# Patient Record
Sex: FEMALE | Race: WHITE | NOT HISPANIC OR LATINO | Employment: FULL TIME | URBAN - METROPOLITAN AREA
[De-identification: names, ages, dates, MRNs, and addresses within clinical notes are randomized per-mention and may not be internally consistent; named-entity substitution may affect disease eponyms.]

---

## 2017-01-07 ENCOUNTER — LAB CONVERSION - ENCOUNTER (OUTPATIENT)
Dept: OTHER | Facility: OTHER | Age: 27
End: 2017-01-07

## 2017-01-07 LAB — TSH SERPL DL<=0.05 MIU/L-ACNC: 5.28 MIU/L

## 2017-01-10 ENCOUNTER — GENERIC CONVERSION - ENCOUNTER (OUTPATIENT)
Dept: OTHER | Facility: OTHER | Age: 27
End: 2017-01-10

## 2017-01-19 ENCOUNTER — ALLSCRIPTS OFFICE VISIT (OUTPATIENT)
Dept: OTHER | Facility: OTHER | Age: 27
End: 2017-01-19

## 2017-02-16 DIAGNOSIS — E03.8 OTHER SPECIFIED HYPOTHYROIDISM: ICD-10-CM

## 2017-02-16 DIAGNOSIS — E66.3 OVERWEIGHT: ICD-10-CM

## 2017-02-16 DIAGNOSIS — E04.1 NONTOXIC SINGLE THYROID NODULE: ICD-10-CM

## 2017-02-17 ENCOUNTER — LAB CONVERSION - ENCOUNTER (OUTPATIENT)
Dept: OTHER | Facility: OTHER | Age: 27
End: 2017-02-17

## 2017-02-17 LAB
T4 FREE SERPL-MCNC: 1.4 NG/DL (ref 0.8–1.8)
TSH SERPL DL<=0.05 MIU/L-ACNC: 3.03 MIU/L

## 2017-02-20 ENCOUNTER — GENERIC CONVERSION - ENCOUNTER (OUTPATIENT)
Dept: OTHER | Facility: OTHER | Age: 27
End: 2017-02-20

## 2017-03-20 DIAGNOSIS — E03.9 HYPOTHYROIDISM: ICD-10-CM

## 2017-03-20 DIAGNOSIS — E03.8 OTHER SPECIFIED HYPOTHYROIDISM: ICD-10-CM

## 2017-03-20 DIAGNOSIS — E04.1 NONTOXIC SINGLE THYROID NODULE: ICD-10-CM

## 2017-03-25 ENCOUNTER — LAB CONVERSION - ENCOUNTER (OUTPATIENT)
Dept: OTHER | Facility: OTHER | Age: 27
End: 2017-03-25

## 2017-03-25 LAB
T4 FREE SERPL-MCNC: 1.3 NG/DL (ref 0.8–1.8)
TSH SERPL DL<=0.05 MIU/L-ACNC: 0.97 MIU/L

## 2017-03-31 ENCOUNTER — GENERIC CONVERSION - ENCOUNTER (OUTPATIENT)
Dept: OTHER | Facility: OTHER | Age: 27
End: 2017-03-31

## 2017-04-28 ENCOUNTER — GENERIC CONVERSION - ENCOUNTER (OUTPATIENT)
Dept: OTHER | Facility: OTHER | Age: 27
End: 2017-04-28

## 2017-05-15 DIAGNOSIS — E04.1 NONTOXIC SINGLE THYROID NODULE: ICD-10-CM

## 2017-05-15 DIAGNOSIS — O99.281 ENDOCRINE, NUTRITIONAL AND METABOLIC DISEASES COMPLICATING PREGNANCY, FIRST TRIMESTER: ICD-10-CM

## 2017-05-15 DIAGNOSIS — O99.211 OBESITY COMPLICATING PREGNANCY IN FIRST TRIMESTER: ICD-10-CM

## 2017-05-15 DIAGNOSIS — E03.8 OTHER SPECIFIED HYPOTHYROIDISM: ICD-10-CM

## 2017-05-15 DIAGNOSIS — E03.9 HYPOTHYROIDISM: ICD-10-CM

## 2017-05-15 DIAGNOSIS — O09.01 SUPERVISION OF PREGNANCY WITH HISTORY OF INFERTILITY IN FIRST TRIMESTER: ICD-10-CM

## 2017-05-27 ENCOUNTER — LAB CONVERSION - ENCOUNTER (OUTPATIENT)
Dept: OTHER | Facility: OTHER | Age: 27
End: 2017-05-27

## 2017-05-27 LAB
T4 FREE SERPL-MCNC: 1.5 NG/DL (ref 0.8–1.8)
TSH SERPL DL<=0.05 MIU/L-ACNC: 0.71 MIU/L

## 2017-06-01 ENCOUNTER — GENERIC CONVERSION - ENCOUNTER (OUTPATIENT)
Dept: OTHER | Facility: OTHER | Age: 27
End: 2017-06-01

## 2017-06-06 ENCOUNTER — ALLSCRIPTS OFFICE VISIT (OUTPATIENT)
Dept: OTHER | Facility: OTHER | Age: 27
End: 2017-06-06

## 2017-06-06 LAB
EXTERNAL CHLAMYDIA SCREEN: NORMAL
EXTERNAL GONORRHEA SCREEN: NORMAL

## 2017-06-08 ENCOUNTER — LAB CONVERSION - ENCOUNTER (OUTPATIENT)
Dept: OTHER | Facility: OTHER | Age: 27
End: 2017-06-08

## 2017-06-08 LAB
BILIRUB UR QL STRIP: NEGATIVE
COLOR UR: YELLOW
COMMENT (HISTORICAL): CLEAR
CULTURE RESULT (HISTORICAL): NORMAL
FECAL OCCULT BLOOD DIAGNOSTIC (HISTORICAL): NEGATIVE
GLUCOSE (HISTORICAL): NEGATIVE
KETONES UR STRIP-MCNC: ABNORMAL MG/DL
LEUKOCYTE ESTERASE UR QL STRIP: NEGATIVE
NITRITE UR QL STRIP: NEGATIVE
PH UR STRIP.AUTO: 5.5 [PH] (ref 5–8)
PROT UR STRIP-MCNC: NEGATIVE MG/DL
SP GR UR STRIP.AUTO: 1.03 (ref 1–1.03)

## 2017-06-13 LAB — CLINICAL COMMENT (HISTORICAL): NORMAL

## 2017-06-24 LAB
EXTERNAL ABO GROUPING: NORMAL
EXTERNAL ANTIBODY SCREEN: NORMAL
EXTERNAL HEMATOCRIT: 36.5 %
EXTERNAL HEMOGLOBIN: 11.8 G/DL
EXTERNAL HEPATITIS B SURFACE ANTIGEN: NORMAL
EXTERNAL HIV-1 ANTIBODY: NORMAL
EXTERNAL PLATELET COUNT: 226 K/ΜL
EXTERNAL RH FACTOR: POSITIVE
EXTERNAL RUBELLA IGG QUANTITATION: NORMAL
EXTERNAL SYPHILIS RPR SCREEN: NORMAL

## 2017-06-26 ENCOUNTER — LAB CONVERSION - ENCOUNTER (OUTPATIENT)
Dept: OTHER | Facility: OTHER | Age: 27
End: 2017-06-26

## 2017-06-26 LAB
AB SCRN, RBC W/RFLX ID,TITER,AG (HISTORICAL): NORMAL
ABO GROUP BLD: NORMAL
BASOPHILS # BLD AUTO: 0.7 %
BASOPHILS # BLD AUTO: 39 CELLS/UL (ref 0–200)
DEPRECATED RDW RBC AUTO: 14.5 % (ref 11–15)
EOSINOPHIL # BLD AUTO: 112 CELLS/UL (ref 15–500)
EOSINOPHIL # BLD AUTO: 2 %
GLUCOSE 1 HR 50 GM GLUC CHALLENGE-PREG PTS (HISTORICAL): 77 MG/DL
HCT VFR BLD AUTO: 36.5 % (ref 35–45)
HEPATITIS B SURFACE ANTIGEN (HISTORICAL): NORMAL
HGB BLD-MCNC: 11.8 G/DL (ref 11.7–15.5)
HIV AG/AB, 4TH GEN (HISTORICAL): NORMAL
LYMPHOCYTES # BLD AUTO: 1114 CELLS/UL (ref 850–3900)
LYMPHOCYTES # BLD AUTO: 19.9 %
MCH RBC QN AUTO: 28.1 PG (ref 27–33)
MCHC RBC AUTO-ENTMCNC: 32.3 G/DL (ref 32–36)
MCV RBC AUTO: 86.9 FL (ref 80–100)
MONOCYTES # BLD AUTO: 319 CELLS/UL (ref 200–950)
MONOCYTES (HISTORICAL): 5.7 %
NEUTROPHILS # BLD AUTO: 4015 CELLS/UL (ref 1500–7800)
NEUTROPHILS # BLD AUTO: 71.7 %
PLATELET # BLD AUTO: 226 THOUSAND/UL (ref 140–400)
PMV BLD AUTO: 8.7 FL (ref 7.5–12.5)
RBC # BLD AUTO: 4.2 MILLION/UL (ref 3.8–5.1)
RH BLD: NORMAL
RPR SCREEN (HISTORICAL): NORMAL
RUBELLA, IGG (HISTORICAL): 2.23 INDEX
WBC # BLD AUTO: 5.6 THOUSAND/UL (ref 3.8–10.8)

## 2017-06-28 ENCOUNTER — GENERIC CONVERSION - ENCOUNTER (OUTPATIENT)
Dept: OTHER | Facility: OTHER | Age: 27
End: 2017-06-28

## 2017-06-29 ENCOUNTER — ALLSCRIPTS OFFICE VISIT (OUTPATIENT)
Dept: PERINATAL CARE | Facility: CLINIC | Age: 27
End: 2017-06-29
Payer: COMMERCIAL

## 2017-06-29 ENCOUNTER — GENERIC CONVERSION - ENCOUNTER (OUTPATIENT)
Dept: OTHER | Facility: OTHER | Age: 27
End: 2017-06-29

## 2017-06-29 DIAGNOSIS — O99.281 ENDOCRINE, NUTRITIONAL AND METABOLIC DISEASES COMPLICATING PREGNANCY, FIRST TRIMESTER: ICD-10-CM

## 2017-06-29 DIAGNOSIS — E03.8 OTHER SPECIFIED HYPOTHYROIDISM: ICD-10-CM

## 2017-06-29 DIAGNOSIS — E04.1 NONTOXIC SINGLE THYROID NODULE: ICD-10-CM

## 2017-06-29 DIAGNOSIS — E03.9 HYPOTHYROIDISM: ICD-10-CM

## 2017-06-29 PROCEDURE — 76813 OB US NUCHAL MEAS 1 GEST: CPT | Performed by: OBSTETRICS & GYNECOLOGY

## 2017-06-29 PROCEDURE — 76801 OB US < 14 WKS SINGLE FETUS: CPT | Performed by: OBSTETRICS & GYNECOLOGY

## 2017-07-01 ENCOUNTER — LAB CONVERSION - ENCOUNTER (OUTPATIENT)
Dept: OTHER | Facility: OTHER | Age: 27
End: 2017-07-01

## 2017-07-01 LAB
T4 FREE SERPL-MCNC: 1.3 NG/DL (ref 0.8–1.8)
TSH SERPL DL<=0.05 MIU/L-ACNC: 0.35 MIU/L

## 2017-07-03 ENCOUNTER — GENERIC CONVERSION - ENCOUNTER (OUTPATIENT)
Dept: OTHER | Facility: OTHER | Age: 27
End: 2017-07-03

## 2017-07-04 ENCOUNTER — GENERIC CONVERSION - ENCOUNTER (OUTPATIENT)
Dept: OTHER | Facility: OTHER | Age: 27
End: 2017-07-04

## 2017-07-06 ENCOUNTER — LAB CONVERSION - ENCOUNTER (OUTPATIENT)
Dept: OTHER | Facility: OTHER | Age: 27
End: 2017-07-06

## 2017-07-06 LAB
AGE RISK DOWN SYNDROME (HISTORICAL): NORMAL
CALC'D GESTATIONAL AGE (HISTORICAL): 13
COLLECTION DATE (HISTORICAL): NORMAL
CROWN RUMP LENGTH (HISTORICAL): 68 MM
CROWN RUMP LENGTH (HISTORICAL): NORMAL MM
DATE OF BIRTH (HISTORICAL): NORMAL
DONOR AGE; EGG RETRIEVAL (HISTORICAL): NORMAL
DONOR EGG (HISTORICAL): NO
EDD DETERMINED BY (HISTORICAL): NORMAL
ESTIMATED DELIVERY DATE (EDD) (HISTORICAL): NORMAL
HCG MOM (HISTORICAL): 0.72
HCG QUANTITATIVE (HISTORICAL): 47.1 IU/ML
HX OF NEURAL TUBE DEFECTS (HISTORICAL): NO
IF TWINS (HISTORICAL): NORMAL
INSULIN DEP. DIABETIC (HISTORICAL): NO
INTERPRETATION (HISTORICAL): NORMAL
MATERNAL WEIGHT (HISTORICAL): 196 LBS
MSS DOWN SYNDROME RISK (HISTORICAL): NORMAL
MSS3 TRISOMY 18 RISK (HISTORICAL): NORMAL
NASAL BONE (HISTORICAL): NORMAL
NASAL BONE (HISTORICAL): PRESENT
NT MOM (HISTORICAL): 1.19
NTQR LOCATION ID (HISTORICAL): NORMAL
NTQR READING PHYS ID (HISTORICAL): NORMAL
NUCHAL TRANSLUCENCY (HISTORICAL): 1.8 MM
NUCHAL TRANSLUCENCY (HISTORICAL): NORMAL MM
NUMBER OF FETUSES (HISTORICAL): 1
PAPP-A (HISTORICAL): 1.12
PAPP-A (HISTORICAL): 822.5 NG/ML
PREV PREGNANCY DOWN SYND (HISTORICAL): NO
RACE/ETHNIC ORIGIN (HISTORICAL): NORMAL
REFERRING PHYSICIAN (HISTORICAL): NORMAL
REFERRING PHYSICIAN NPI (HISTORICAL): NORMAL
REFERRING PHYSICIAN PHONE (HISTORICAL): NORMAL
REPEAT SPECIMEN (HISTORICAL): NO
ULTRASONOGRAPHER ID (HISTORICAL): NORMAL
ULTRASOUND DATE (HISTORICAL): NORMAL

## 2017-07-07 ENCOUNTER — GENERIC CONVERSION - ENCOUNTER (OUTPATIENT)
Dept: OTHER | Facility: OTHER | Age: 27
End: 2017-07-07

## 2017-07-21 ENCOUNTER — LAB CONVERSION - ENCOUNTER (OUTPATIENT)
Dept: OTHER | Facility: OTHER | Age: 27
End: 2017-07-21

## 2017-07-21 LAB
AFP (HISTORICAL): 1.08
AFP (HISTORICAL): 27.5 NG/ML
AGE RISK DOWN SYNDROME (HISTORICAL): NORMAL
CALC'D GESTATIONAL AGE (HISTORICAL): 15.6
COLLECTION DATE (HISTORICAL): NORMAL
CROWN RUMP LENGTH (HISTORICAL): 68 MM
DATE OF BIRTH (HISTORICAL): NORMAL
ESTIMATED DELIVERY DATE (EDD) (HISTORICAL): NORMAL
ESTRADIOL, FREE (HISTORICAL): 0.6 NG/ML
ESTRIOL MOM (HISTORICAL): 0.92
HCG MOM (HISTORICAL): 0.59
HCG QUANTITATIVE (HISTORICAL): 19.4 IU/ML
HX OF NEURAL TUBE DEFECTS (HISTORICAL): NO
INHIBIN A (HISTORICAL): 105 PG/ML
INHIBIN A MOM (HISTORICAL): 0.68
INSULIN DEP. DIABETIC (HISTORICAL): NO
INTERPRETATION (HISTORICAL): NORMAL
MATERNAL WEIGHT (HISTORICAL): 199 LBS
MSAFP RISK OPEN NTD (HISTORICAL): NORMAL
MSS DOWN SYNDROME RISK (HISTORICAL): NORMAL
MSS3 TRISOMY 18 RISK (HISTORICAL): NORMAL
NASAL BONE (HISTORICAL): NORMAL
NASAL BONE (HISTORICAL): PRESENT
NT MOM (HISTORICAL): 1.19
NUCHAL TRANSLUCENCY (HISTORICAL): 1.8 MM
NUMBER OF FETUSES (HISTORICAL): 1
PAPP-A (HISTORICAL): 1.14
PAPP-A (HISTORICAL): 822.5 NG/ML
RACE/ETHNIC ORIGIN (HISTORICAL): NORMAL
REFERRING PHYSICIAN (HISTORICAL): NORMAL
REFERRING PHYSICIAN NPI (HISTORICAL): NORMAL
REFERRING PHYSICIAN PHONE (HISTORICAL): NORMAL
REPEAT SPECIMEN (HISTORICAL): NO
SPECIMEN: (HISTORICAL): NORMAL
ULTRASOUND DATE (HISTORICAL): NORMAL

## 2017-07-24 ENCOUNTER — GENERIC CONVERSION - ENCOUNTER (OUTPATIENT)
Dept: OTHER | Facility: OTHER | Age: 27
End: 2017-07-24

## 2017-07-28 ENCOUNTER — GENERIC CONVERSION - ENCOUNTER (OUTPATIENT)
Dept: OTHER | Facility: OTHER | Age: 27
End: 2017-07-28

## 2017-08-03 DIAGNOSIS — E03.8 OTHER SPECIFIED HYPOTHYROIDISM: ICD-10-CM

## 2017-08-05 ENCOUNTER — LAB CONVERSION - ENCOUNTER (OUTPATIENT)
Dept: OTHER | Facility: OTHER | Age: 27
End: 2017-08-05

## 2017-08-05 LAB
T4 FREE SERPL-MCNC: 1.4 NG/DL (ref 0.8–1.8)
TSH SERPL DL<=0.05 MIU/L-ACNC: 0.6 MIU/L

## 2017-08-06 ENCOUNTER — GENERIC CONVERSION - ENCOUNTER (OUTPATIENT)
Dept: OTHER | Facility: OTHER | Age: 27
End: 2017-08-06

## 2017-08-20 ENCOUNTER — GENERIC CONVERSION - ENCOUNTER (OUTPATIENT)
Dept: OTHER | Facility: OTHER | Age: 27
End: 2017-08-20

## 2017-08-21 ENCOUNTER — ALLSCRIPTS OFFICE VISIT (OUTPATIENT)
Dept: PERINATAL CARE | Facility: CLINIC | Age: 27
End: 2017-08-21
Payer: COMMERCIAL

## 2017-08-21 ENCOUNTER — GENERIC CONVERSION - ENCOUNTER (OUTPATIENT)
Dept: OTHER | Facility: OTHER | Age: 27
End: 2017-08-21

## 2017-08-21 ENCOUNTER — ALLSCRIPTS OFFICE VISIT (OUTPATIENT)
Dept: OTHER | Facility: OTHER | Age: 27
End: 2017-08-21

## 2017-08-21 PROCEDURE — 76817 TRANSVAGINAL US OBSTETRIC: CPT | Performed by: OBSTETRICS & GYNECOLOGY

## 2017-08-21 PROCEDURE — 76811 OB US DETAILED SNGL FETUS: CPT | Performed by: OBSTETRICS & GYNECOLOGY

## 2017-09-04 DIAGNOSIS — O99.282 ENDOCRINE, NUTRITIONAL AND METABOLIC DISEASES COMPLICATING PREGNANCY, SECOND TRIMESTER: ICD-10-CM

## 2017-09-04 DIAGNOSIS — E03.9 HYPOTHYROIDISM: ICD-10-CM

## 2017-09-04 DIAGNOSIS — E04.1 NONTOXIC SINGLE THYROID NODULE: ICD-10-CM

## 2017-09-04 DIAGNOSIS — E03.8 OTHER SPECIFIED HYPOTHYROIDISM: ICD-10-CM

## 2017-09-06 ENCOUNTER — LAB CONVERSION - ENCOUNTER (OUTPATIENT)
Dept: OTHER | Facility: OTHER | Age: 27
End: 2017-09-06

## 2017-09-06 LAB
T4 FREE SERPL-MCNC: 1.2 NG/DL (ref 0.8–1.8)
TSH SERPL DL<=0.05 MIU/L-ACNC: 0.42 MIU/L

## 2017-09-11 ENCOUNTER — GENERIC CONVERSION - ENCOUNTER (OUTPATIENT)
Dept: OTHER | Facility: OTHER | Age: 27
End: 2017-09-11

## 2017-09-13 ENCOUNTER — LAB CONVERSION - ENCOUNTER (OUTPATIENT)
Dept: OTHER | Facility: OTHER | Age: 27
End: 2017-09-13

## 2017-09-27 ENCOUNTER — ALLSCRIPTS OFFICE VISIT (OUTPATIENT)
Dept: OTHER | Facility: OTHER | Age: 27
End: 2017-09-27

## 2017-10-15 ENCOUNTER — LAB CONVERSION - ENCOUNTER (OUTPATIENT)
Dept: OTHER | Facility: OTHER | Age: 27
End: 2017-10-15

## 2017-10-15 LAB
T4 FREE SERPL-MCNC: 1.3 NG/DL (ref 0.8–1.8)
TSH SERPL DL<=0.05 MIU/L-ACNC: 0.56 MIU/L

## 2017-10-16 ENCOUNTER — GENERIC CONVERSION - ENCOUNTER (OUTPATIENT)
Dept: OTHER | Facility: OTHER | Age: 27
End: 2017-10-16

## 2017-10-16 ENCOUNTER — ALLSCRIPTS OFFICE VISIT (OUTPATIENT)
Dept: OTHER | Facility: OTHER | Age: 27
End: 2017-10-16

## 2017-10-16 DIAGNOSIS — O99.213 OBESITY COMPLICATING PREGNANCY IN THIRD TRIMESTER: ICD-10-CM

## 2017-10-16 DIAGNOSIS — O09.813 SUPERVISION OF PREGNANCY RESULTING FROM ASSISTED REPRODUCTIVE TECHNOLOGY IN THIRD TRIMESTER: ICD-10-CM

## 2017-10-16 DIAGNOSIS — O99.283 ENDOCRINE, NUTRITIONAL AND METABOLIC DISEASES COMPLICATING PREGNANCY, THIRD TRIMESTER: ICD-10-CM

## 2017-10-17 ENCOUNTER — LAB CONVERSION - ENCOUNTER (OUTPATIENT)
Dept: OTHER | Facility: OTHER | Age: 27
End: 2017-10-17

## 2017-10-17 LAB
BASOPHILS # BLD AUTO: 0.4 %
BASOPHILS # BLD AUTO: 28 CELLS/UL (ref 0–200)
DEPRECATED RDW RBC AUTO: 13.3 % (ref 11–15)
EOSINOPHIL # BLD AUTO: 1.6 %
EOSINOPHIL # BLD AUTO: 114 CELLS/UL (ref 15–500)
GLUCOSE 1 HR 50 GM GLUC CHALLENGE-PREG PTS (HISTORICAL): 100 MG/DL
HCT VFR BLD AUTO: 32.1 % (ref 35–45)
HGB BLD-MCNC: 10.7 G/DL (ref 11.7–15.5)
LYMPHOCYTES # BLD AUTO: 1179 CELLS/UL (ref 850–3900)
LYMPHOCYTES # BLD AUTO: 16.6 %
MCH RBC QN AUTO: 29.5 PG (ref 27–33)
MCHC RBC AUTO-ENTMCNC: 33.3 G/DL (ref 32–36)
MCV RBC AUTO: 88.4 FL (ref 80–100)
MONOCYTES # BLD AUTO: 383 CELLS/UL (ref 200–950)
MONOCYTES (HISTORICAL): 5.4 %
NEUTROPHILS # BLD AUTO: 5396 CELLS/UL (ref 1500–7800)
NEUTROPHILS # BLD AUTO: 76 %
PLATELET # BLD AUTO: 216 THOUSAND/UL (ref 140–400)
PMV BLD AUTO: 10.5 FL (ref 7.5–12.5)
RBC # BLD AUTO: 3.63 MILLION/UL (ref 3.8–5.1)
RPR SCREEN (HISTORICAL): NORMAL
WBC # BLD AUTO: 7.1 THOUSAND/UL (ref 3.8–10.8)

## 2017-10-30 ENCOUNTER — ALLSCRIPTS OFFICE VISIT (OUTPATIENT)
Dept: OTHER | Facility: OTHER | Age: 27
End: 2017-10-30

## 2017-11-14 LAB
T4 FREE SERPL-MCNC: 1.4 NG/DL (ref 0.9–2.2)
T4 TOTAL (HISTORICAL): 13.3 MCG/DL (ref 4.8–10.4)
TSH SERPL DL<=0.05 MIU/L-ACNC: 0.6 MIU/L

## 2017-11-17 ENCOUNTER — ALLSCRIPTS OFFICE VISIT (OUTPATIENT)
Dept: OTHER | Facility: OTHER | Age: 27
End: 2017-11-17

## 2017-11-18 NOTE — PROCEDURES
Assessment  1  Excessive weight gain in pregnancy, third trimester (999 40,342 9) (O26 03)   2  Pregnancy resulting from in vitro fertilization in third trimester (V23 85) (A58 498)    Active Problems     1  Adult hypothyroidism (244 9) (E03 9)   2  Hypothyroidism due to Hashimoto's thyroiditis (244 8,245 2) (E03 8,E06 3)   3  Need for prophylactic vaccination and inoculation against influenza (V04 81) (Z23)   4  Overweight (BMI 25 0-29 9) (278 02) (E66 3)   5  Pregnancy with history of infertility in first trimester (V23 0) (O09 01)   6  Right thyroid nodule (241 0) (E04 1)   7  Threatened premature labor in second trimester (644 03) (O47 02)  Hypothyroid in pregnancy, antepartum, third trimester (987 96) (O99 283)     Obesity affecting pregnancy in third trimester (649 13) (O99 213)     Pregnancy resulting from in vitro fertilization in third trimester (V23 85) (O09 813)     Excessive weight gain in pregnancy, third trimester (646 13) (O26 03)       Current Meds    1  Levothyroxine Sodium 175 MCG Oral Tablet; TAKE 1 TABLET DAILY; Therapy: 29CJU4536 to (Evaluate:02Drz6457)  Requested for: 53RTZ4547; Last Rx:14Nov2017; Status: ACTIVE - Renewal Denied Ordered    2  Prenatal Vitamin TABS; TAKE 1 TABLET DAILY; Therapy: (Recorded:19Jan2017) to Recorded   3  Progesterone CAPS; 1 DAILY; Therapy: (Recorded:49Uhc0459) to Recorded    Allergies  1  No Known Drug Allergies    2  No Known Environmental Allergies   3  Nuts   4  VEGETABLES    Results/Data  [id number] Abdominal Ultrasound OB [place]:  Procedure: 42786- Ultrasound pregnant uterus real time with image documentation, limited one or more fetuses  -- The study was done today in the office  Indication: EDC gestational age 27w0d weeks  Exam indication: Growth  Findings:  Amniotic fluid volume: 20 1cm  Fetal heart beat: 138bpm   Placental location: Posterior Mid II  Fetal position: VERTEX    Impression: 53%=0mf02wn  is a single intrauterine pregnancy measuring 33 weeks 6 days which is consistent with the EGA of 33 weeks  The fetus is in cephalic presentation and the FHR is 138 bpm  The placenta is posterior in location and grade II in appearance  There has been adequate fetal growth  The EFW is 2199g (4#14oz, 53%)  The amniotic fluid index measures 20 1 cm  F/U as clinically indicated8  47cm (34+1, 76%)30 97 cm (34+4, 55%)28 9 cm (32+6, 48%)FL: 6 56 cm (33+6, 61%)  Future Appointments    Date/Time Provider Specialty Site   12/01/2017 09:30 AM Ed CAESAR Forrest  Obstetrics/Gynecology [address] [place] [address] OB/GYN   12/18/2017 03:45 PM Ed CAESAR Forrest  Obstetrics/Gynecology [address] [place] [address] OB/GYN   01/08/2018 10:00 AM Ed CAESAR Forrest  Obstetrics/Gynecology [address] [place] [address] OB/GYN   12/05/2017 12:50 PM CAESAR Rosas  Endocrinology [facility] ENDOCRINOLOGY   12/11/2017 03:45 PM CAESAR Fonseca  Obstetrics/Gynecology [address] [place] [address] OB/GYN   12/26/2017 10:00 AM CAESAR Fonseca  Obstetrics/Gynecology [address] [place] [address] OB/GYN   01/02/2018 10:00 AM CAESAR Fonseca OB/GYN       Signatures   Electronically signed by : Geoffrey Sy, ; Nov 17 2017  9:06AM EST                       (Author)    Electronically signed by : CAESAR Reed ; Nov 17 2017  2:07PM EST                       (Author)

## 2017-12-01 ENCOUNTER — GENERIC CONVERSION - ENCOUNTER (OUTPATIENT)
Dept: OTHER | Facility: OTHER | Age: 27
End: 2017-12-01

## 2017-12-07 ENCOUNTER — ALLSCRIPTS OFFICE VISIT (OUTPATIENT)
Dept: OTHER | Facility: OTHER | Age: 27
End: 2017-12-07

## 2017-12-07 DIAGNOSIS — O99.283 ENDOCRINE, NUTRITIONAL AND METABOLIC DISEASES COMPLICATING PREGNANCY, THIRD TRIMESTER: ICD-10-CM

## 2017-12-11 ENCOUNTER — GENERIC CONVERSION - ENCOUNTER (OUTPATIENT)
Dept: OTHER | Facility: OTHER | Age: 27
End: 2017-12-11

## 2017-12-11 LAB — CULTURE GENITAL-BSB ON (HISTORICAL): NORMAL

## 2017-12-12 ENCOUNTER — LAB CONVERSION - ENCOUNTER (OUTPATIENT)
Dept: OTHER | Facility: OTHER | Age: 27
End: 2017-12-12

## 2017-12-12 LAB
EXTERNAL GROUP B STREP ANTIGEN: NEGATIVE
T4 FREE SERPL-MCNC: 1.3 NG/DL (ref 0.8–1.8)
TSH SERPL DL<=0.05 MIU/L-ACNC: 0.43 MIU/L

## 2017-12-18 ENCOUNTER — GENERIC CONVERSION - ENCOUNTER (OUTPATIENT)
Dept: OTHER | Facility: OTHER | Age: 27
End: 2017-12-18

## 2017-12-21 ENCOUNTER — OB ABSTRACT (OUTPATIENT)
Dept: OBGYN CLINIC | Facility: CLINIC | Age: 27
End: 2017-12-21

## 2017-12-21 PROBLEM — O99.283 HYPOTHYROID IN PREGNANCY, ANTEPARTUM, THIRD TRIMESTER: Status: ACTIVE | Noted: 2017-12-21

## 2017-12-21 PROBLEM — O09.813 PREGNANCY RESULTING FROM IN VITRO FERTILIZATION IN THIRD TRIMESTER: Status: ACTIVE | Noted: 2017-12-21

## 2017-12-21 PROBLEM — E03.9 HYPOTHYROID IN PREGNANCY, ANTEPARTUM, THIRD TRIMESTER: Status: ACTIVE | Noted: 2017-12-21

## 2017-12-21 PROBLEM — O26.03 EXCESSIVE WEIGHT GAIN DURING PREGNANCY IN THIRD TRIMESTER: Status: ACTIVE | Noted: 2017-12-21

## 2017-12-21 RX ORDER — LEVOTHYROXINE SODIUM 175 UG/1
175 TABLET ORAL DAILY
COMMUNITY
End: 2018-03-05

## 2017-12-26 ENCOUNTER — GENERIC CONVERSION - ENCOUNTER (OUTPATIENT)
Dept: OTHER | Facility: OTHER | Age: 27
End: 2017-12-26

## 2018-01-02 ENCOUNTER — GENERIC CONVERSION - ENCOUNTER (OUTPATIENT)
Dept: OTHER | Facility: OTHER | Age: 28
End: 2018-01-02

## 2018-01-09 ENCOUNTER — GENERIC CONVERSION - ENCOUNTER (OUTPATIENT)
Dept: OTHER | Facility: OTHER | Age: 28
End: 2018-01-09

## 2018-01-09 NOTE — RESULT NOTES
Verified Results  (Q) STEPWISE, PART 1 [id number] 03:35PM Ayanna Griffith     Test Name Result Flag Reference   INTERPRETATION SEE NOTE     This patient's risk does not exceed the first trimester  cut-off for Down syndrome or trisomy 18  The integrated  screen calculation is awaiting the second trimester sample  NT WAS USED IN THE RISK CALCULATIONS  Thank you for submitting this patient's Part 1 specimen  These first trimester values will be incorporated with the  second trimester values as part of the integrated testing  process  Please submit the Part 2 specimen between   07/14/2017-09/07/2017 (15 0 and 22 9 weeks gestation) with   07/14/2017-07/27/2017 (15 0 - 16 9 weeks gestation) being  optimal  When submitting Part 2, please include the  following Specimen # from Part 1:  Cervantes Colonel   AGE RISK DOWN SYNDROME 1:640     AMY DOWN SYNDROME RISK <1:5000  <1:50   RISK FOR TRISOMY 18 <1:5000  <1:100   CALCULATED GESTATIONAL$AGE 13 0     Crown rump length (CRL) was used to calculate gestational  age  KARAN, if provided, was not used for gestational age  dating  YAJAIRA-A 822 5 ng/mL     This test was performed using a kit that has not been  cleared or approved by the FDA  The analytical performance  characteristics of this test have been determined by [facility]  This test  should not be used for diagnosis without confirmation by  other medically established means  YAJAIRA-A MOM 1 12     HCG 47 1 IU/mL     HCG MOM 0 72     NT MOM 1 19     The maternal serum screening results indicate a lower risk  of trisomy 21 in this pregnancy  The nasal bone was assessed  via ultrasound and was present  The combined risk is  therefore likely to be less than the calculated risk  Other  findings later in the pregnancy may change the risk  Nasal bone assessment is best accomplished through a fetal  ultrasound performed between 11 weeks 0 days through 13  weeks 6 days   In assessing the risk for aneuploidy, the  evaluation of the maternal serum markers plus the nuchal  thickness measurement is calculated first  Any potential  change to the patient's risk for aneuploidy depends on the  nuchal thickness, crown-rump length, and the ethnic origin,  and therefore the values generated by the algorithm itself  will not change  Additional information about the assessment  of the fetal nasal bone may be found on the [facility] website at  http://www  fetalmedicine com/fmf/training-certification/cert  avyglitr-lg-nlqhr  tence/11-13-week-scan/assessment-of-the-nasal-bone/  Please note that the Sequential Integrated maternal serum  screen for Down syndrome risk assessment was designed by Dr Magdalena Ricketts ([address], et al J Med Screen 2003 v10 p56-104)  to provide a high detection rate and low false positive rate  when a cutoff of 1:50 is used to identify high risk  pregnancies during the first trimester  Use of any other  cutoff for determination of risk in the first trimester will  result in a higher false positive rate for the two-part  screen  All patients whose risk is lower than 1:50 should  have a second sample submitted to complete the screen  This is a screening test, not a diagnostic test      This risk assessment is based on demographic data provided  by the ordering physician  Please notify the laboratory  promptly if any data are incorrect  If you have questions concerning this report: For clinical consultation, call [phone]; For technical questions, call [phone] ext [phone]; For recalculations, fax to [phone]  For additional information, please refer to  http://[employer]/faq/FAQ85  (This link is provided for informational/educational  purposes only )   REFERRING PHYSICIAN NAME [address] [place] [address] PHONE [phone]     REFERRING PHYSICIAN NPI [id number]     DATE OF BIRTH 1990     COLLECTION DATE 06/30/2017     ULTRASOUND DATE 06/29/2017     ULTRASONOGRAPHER'S NAME TELLO WINTER     NTQR ULTRASONOGRAPHER ID# [id number]     NTQR LOCATION ID# NOT GIVEN     NTQR READING PHYS ID# [id number]     [facility] ULTRASONOGRAPHER ID# NOT GIVEN     CROWN RUMP LENGTH 68 mm     NUCHAL TRANSLUCENCY 1 8 mm     IF TWINS NOT GIVEN     TWIN B CRL NOT GIVEN mm     TWIN B NT NOT GIVEN mm     MATERNAL WEIGHT 196 lbs     EST'D DATE OF DELIVERY 01/05/2018     KARAN DETERMINED BY LMP     MOTHER'S ETHNIC ORIGIN      NUMBER OF FETUSES 1     INSULIN DEPEND DIABETIC NO     REPEAT SPECIMEN NO     HX OF NEURAL TUBE DEFECTS NO     PREV PREG DOWN SYND NO     DONOR EGG NO     DONOR EGG; EGG RETRIEVAL NOT GIVEN     Nasal Bone PRESENT     Twin B Nasal Bone NOT GIVEN

## 2018-01-10 ENCOUNTER — ANESTHESIA (INPATIENT)
Dept: LABOR AND DELIVERY | Facility: HOSPITAL | Age: 28
End: 2018-01-10
Payer: COMMERCIAL

## 2018-01-10 ENCOUNTER — ANESTHESIA EVENT (INPATIENT)
Dept: LABOR AND DELIVERY | Facility: HOSPITAL | Age: 28
End: 2018-01-10
Payer: COMMERCIAL

## 2018-01-10 ENCOUNTER — HOSPITAL ENCOUNTER (INPATIENT)
Facility: HOSPITAL | Age: 28
LOS: 2 days | Discharge: HOME | End: 2018-01-12
Attending: OBSTETRICS & GYNECOLOGY | Admitting: OBSTETRICS & GYNECOLOGY
Payer: COMMERCIAL

## 2018-01-10 DIAGNOSIS — O48.0 POST TERM PREGNANCY: ICD-10-CM

## 2018-01-10 DIAGNOSIS — Z3A.40 40 WEEKS GESTATION OF PREGNANCY: ICD-10-CM

## 2018-01-10 LAB
BASE EXCESS BLDCOA CALC-SCNC: -2.6 MMOL/L (ref 3–11)
BASE EXCESS BLDCOV CALC-SCNC: -1.6 MMOL/L (ref 1–9)
BASOPHILS # BLD AUTO: 0.03 THOUSANDS/ΜL (ref 0–0.1)
BASOPHILS NFR BLD AUTO: 0 % (ref 0–1)
EOSINOPHIL # BLD AUTO: 0.07 THOUSAND/ΜL (ref 0–0.61)
EOSINOPHIL NFR BLD AUTO: 1 % (ref 0–6)
ERYTHROCYTE [DISTWIDTH] IN BLOOD BY AUTOMATED COUNT: 14.3 % (ref 11.6–15.1)
HCO3 BLDCOA-SCNC: 23.2 MMOL/L (ref 17.3–27.3)
HCO3 BLDCOV-SCNC: 23.2 MMOL/L (ref 12.2–28.6)
HCT VFR BLD AUTO: 37.7 % (ref 34.8–46.1)
HGB BLD-MCNC: 12.9 G/DL (ref 11.5–15.4)
LYMPHOCYTES # BLD AUTO: 1.11 THOUSANDS/ΜL (ref 0.6–4.47)
LYMPHOCYTES NFR BLD AUTO: 9 % (ref 14–44)
MCH RBC QN AUTO: 29.6 PG (ref 26.8–34.3)
MCHC RBC AUTO-ENTMCNC: 34.2 G/DL (ref 31.4–37.4)
MCV RBC AUTO: 87 FL (ref 82–98)
MONOCYTES # BLD AUTO: 0.58 THOUSAND/ΜL (ref 0.17–1.22)
MONOCYTES NFR BLD AUTO: 5 % (ref 4–12)
NEUTROPHILS # BLD AUTO: 10.01 THOUSANDS/ΜL (ref 1.85–7.62)
NEUTS SEG NFR BLD AUTO: 85 % (ref 43–75)
NRBC BLD AUTO-RTO: 0 /100 WBCS
O2 CT VFR BLDCOA CALC: 8.4 ML/DL
OXYHGB MFR BLDCOA: 33.3 %
OXYHGB MFR BLDCOV: 50.2 %
PCO2 BLDCOA: 43.4 MM[HG] (ref 30–60)
PCO2 BLDCOV: 39.9 MM HG (ref 27–43)
PH BLDCOA: 7.34 [PH] (ref 7.23–7.43)
PH BLDCOV: 7.38 [PH] (ref 7.19–7.49)
PLATELET # BLD AUTO: 206 THOUSANDS/UL (ref 149–390)
PMV BLD AUTO: 11.3 FL (ref 8.9–12.7)
PO2 BLDCOA: 16.6 MM HG (ref 5–25)
PO2 BLDCOV: 20.6 MM HG (ref 15–45)
RBC # BLD AUTO: 4.36 MILLION/UL (ref 3.81–5.12)
RPR SER QL: NORMAL
SAO2 % BLDCOV: 13.6 ML/DL
WBC # BLD AUTO: 11.8 THOUSAND/UL (ref 4.31–10.16)

## 2018-01-10 PROCEDURE — 99202 OFFICE O/P NEW SF 15 MIN: CPT

## 2018-01-10 PROCEDURE — 86592 SYPHILIS TEST NON-TREP QUAL: CPT | Performed by: OBSTETRICS & GYNECOLOGY

## 2018-01-10 PROCEDURE — 85025 COMPLETE CBC W/AUTO DIFF WBC: CPT | Performed by: OBSTETRICS & GYNECOLOGY

## 2018-01-10 PROCEDURE — 0UQMXZZ REPAIR VULVA, EXTERNAL APPROACH: ICD-10-PCS | Performed by: OBSTETRICS & GYNECOLOGY

## 2018-01-10 PROCEDURE — 0UQGXZZ REPAIR VAGINA, EXTERNAL APPROACH: ICD-10-PCS | Performed by: OBSTETRICS & GYNECOLOGY

## 2018-01-10 PROCEDURE — 0KQM0ZZ REPAIR PERINEUM MUSCLE, OPEN APPROACH: ICD-10-PCS | Performed by: OBSTETRICS & GYNECOLOGY

## 2018-01-10 PROCEDURE — 82805 BLOOD GASES W/O2 SATURATION: CPT | Performed by: OBSTETRICS & GYNECOLOGY

## 2018-01-10 RX ORDER — BUTORPHANOL TARTRATE 1 MG/ML
1 INJECTION, SOLUTION INTRAMUSCULAR; INTRAVENOUS ONCE
Status: COMPLETED | OUTPATIENT
Start: 2018-01-10 | End: 2018-01-10

## 2018-01-10 RX ORDER — ONDANSETRON 2 MG/ML
4 INJECTION INTRAMUSCULAR; INTRAVENOUS EVERY 8 HOURS PRN
Status: DISCONTINUED | OUTPATIENT
Start: 2018-01-10 | End: 2018-01-12 | Stop reason: HOSPADM

## 2018-01-10 RX ORDER — OXYTOCIN/RINGER'S LACTATE 30/500 ML
250 PLASTIC BAG, INJECTION (ML) INTRAVENOUS CONTINUOUS
Status: ACTIVE | OUTPATIENT
Start: 2018-01-10 | End: 2018-01-10

## 2018-01-10 RX ORDER — IBUPROFEN 600 MG/1
600 TABLET ORAL EVERY 6 HOURS PRN
Status: DISCONTINUED | OUTPATIENT
Start: 2018-01-10 | End: 2018-01-12 | Stop reason: HOSPADM

## 2018-01-10 RX ORDER — ACETAMINOPHEN 325 MG/1
650 TABLET ORAL EVERY 6 HOURS PRN
Status: DISCONTINUED | OUTPATIENT
Start: 2018-01-10 | End: 2018-01-12 | Stop reason: HOSPADM

## 2018-01-10 RX ORDER — DIAPER,BRIEF,INFANT-TODD,DISP
1 EACH MISCELLANEOUS AS NEEDED
Status: DISCONTINUED | OUTPATIENT
Start: 2018-01-10 | End: 2018-01-12 | Stop reason: HOSPADM

## 2018-01-10 RX ORDER — ROPIVACAINE HYDROCHLORIDE 2 MG/ML
INJECTION, SOLUTION EPIDURAL; INFILTRATION; PERINEURAL AS NEEDED
Status: DISCONTINUED | OUTPATIENT
Start: 2018-01-10 | End: 2018-01-10 | Stop reason: SURG

## 2018-01-10 RX ORDER — SODIUM CHLORIDE, SODIUM LACTATE, POTASSIUM CHLORIDE, CALCIUM CHLORIDE 600; 310; 30; 20 MG/100ML; MG/100ML; MG/100ML; MG/100ML
125 INJECTION, SOLUTION INTRAVENOUS CONTINUOUS
Status: DISCONTINUED | OUTPATIENT
Start: 2018-01-10 | End: 2018-01-12 | Stop reason: HOSPADM

## 2018-01-10 RX ORDER — OXYCODONE HYDROCHLORIDE AND ACETAMINOPHEN 5; 325 MG/1; MG/1
2 TABLET ORAL EVERY 4 HOURS PRN
Status: DISCONTINUED | OUTPATIENT
Start: 2018-01-10 | End: 2018-01-12 | Stop reason: HOSPADM

## 2018-01-10 RX ORDER — DOCUSATE SODIUM 100 MG/1
100 CAPSULE, LIQUID FILLED ORAL 2 TIMES DAILY
Status: DISCONTINUED | OUTPATIENT
Start: 2018-01-10 | End: 2018-01-12 | Stop reason: HOSPADM

## 2018-01-10 RX ORDER — PROMETHAZINE HYDROCHLORIDE 25 MG/ML
12.5 INJECTION, SOLUTION INTRAMUSCULAR; INTRAVENOUS ONCE
Status: COMPLETED | OUTPATIENT
Start: 2018-01-10 | End: 2018-01-10

## 2018-01-10 RX ORDER — CALCIUM CARBONATE 200(500)MG
1000 TABLET,CHEWABLE ORAL DAILY PRN
Status: DISCONTINUED | OUTPATIENT
Start: 2018-01-10 | End: 2018-01-12 | Stop reason: HOSPADM

## 2018-01-10 RX ORDER — SIMETHICONE 80 MG
80 TABLET,CHEWABLE ORAL 4 TIMES DAILY PRN
Status: DISCONTINUED | OUTPATIENT
Start: 2018-01-10 | End: 2018-01-12 | Stop reason: HOSPADM

## 2018-01-10 RX ORDER — PROMETHAZINE HYDROCHLORIDE 25 MG/ML
25 INJECTION, SOLUTION INTRAMUSCULAR; INTRAVENOUS ONCE
Status: DISCONTINUED | OUTPATIENT
Start: 2018-01-10 | End: 2018-01-10

## 2018-01-10 RX ORDER — OXYTOCIN/RINGER'S LACTATE 30/500 ML
PLASTIC BAG, INJECTION (ML) INTRAVENOUS
Status: COMPLETED
Start: 2018-01-10 | End: 2018-01-10

## 2018-01-10 RX ORDER — OXYCODONE HYDROCHLORIDE AND ACETAMINOPHEN 5; 325 MG/1; MG/1
1 TABLET ORAL EVERY 4 HOURS PRN
Status: DISCONTINUED | OUTPATIENT
Start: 2018-01-10 | End: 2018-01-12 | Stop reason: HOSPADM

## 2018-01-10 RX ORDER — DIPHENHYDRAMINE HYDROCHLORIDE 50 MG/ML
25 INJECTION INTRAMUSCULAR; INTRAVENOUS EVERY 6 HOURS PRN
Status: DISCONTINUED | OUTPATIENT
Start: 2018-01-10 | End: 2018-01-12 | Stop reason: HOSPADM

## 2018-01-10 RX ADMIN — Medication 30 UNITS: at 19:00

## 2018-01-10 RX ADMIN — BENZOCAINE AND MENTHOL: 20; .5 SPRAY TOPICAL at 20:03

## 2018-01-10 RX ADMIN — IBUPROFEN 600 MG: 600 TABLET, FILM COATED ORAL at 20:03

## 2018-01-10 RX ADMIN — SODIUM CHLORIDE, SODIUM LACTATE, POTASSIUM CHLORIDE, AND CALCIUM CHLORIDE 125 ML/HR: .6; .31; .03; .02 INJECTION, SOLUTION INTRAVENOUS at 09:02

## 2018-01-10 RX ADMIN — ROPIVACAINE HYDROCHLORIDE 5 ML: 2 INJECTION, SOLUTION EPIDURAL; INFILTRATION at 11:08

## 2018-01-10 RX ADMIN — ROPIVACAINE HYDROCHLORIDE 5 ML: 2 INJECTION, SOLUTION EPIDURAL; INFILTRATION at 11:10

## 2018-01-10 RX ADMIN — SODIUM CHLORIDE, SODIUM LACTATE, POTASSIUM CHLORIDE, AND CALCIUM CHLORIDE 125 ML/HR: .6; .31; .03; .02 INJECTION, SOLUTION INTRAVENOUS at 11:20

## 2018-01-10 RX ADMIN — PROMETHAZINE HYDROCHLORIDE 12.5 MG: 25 INJECTION INTRAMUSCULAR; INTRAVENOUS at 09:07

## 2018-01-10 RX ADMIN — ROPIVACAINE HYDROCHLORIDE: 2 INJECTION, SOLUTION EPIDURAL; INFILTRATION at 11:15

## 2018-01-10 RX ADMIN — DOCUSATE SODIUM 100 MG: 100 CAPSULE, LIQUID FILLED ORAL at 20:04

## 2018-01-10 RX ADMIN — WITCH HAZEL 1 PAD: 500 SOLUTION RECTAL; TOPICAL at 20:03

## 2018-01-10 RX ADMIN — BUTORPHANOL TARTRATE 1 MG: 1 INJECTION, SOLUTION INTRAMUSCULAR; INTRAVENOUS at 09:05

## 2018-01-10 NOTE — RESULT NOTES
Discussion/Summary   TSH normal, continue current dose of Levothyroxine, repeat TSH/free T4 in 4 weeks        Verified Results  (1) T4, FREE [id number] 12:47PM Vicky Nipper     Test Name Result Flag Reference   T4, FREE 1 4 ng/dL  0 8-1 8     (Q) TSH, 3RD GENERATION [id number] 12:47PM Vicky Nipper     Test Name Result Flag Reference   TSH 0 60 mIU/L     Reference Range                         > or = 20 Years  0 40-4 50                              Pregnancy Ranges            First trimester    0 26-2 66            Second trimester   0 55-2 73            Third trimester    0 43-2 91

## 2018-01-10 NOTE — OB LABOR/OXYTOCIN SAFETY PROGRESS
Labor Progress Note - Patricia Bean 28 y o  female MRN: [id number]    Unit/Bed#: L&D 324-01 Encounter: [id number]    Obstetric History       T0      L0     SAB0   TAB0   Ectopic0   Multiple0   Live Births0      Gestational Age: 39w6d     Contraction Frequency (minutes): 3-4  Contraction Quality: Moderate  Tachysystole: No   Dilation: 6        Effacement (%): 90  Station: 0  Baseline Rate: 120 bpm  Fetal Heart Rate: 128 BPM  FHR Category: Category I          Notes/comments:     Pt doing well, feeling intermittent pressure, will continue expectant management for now     PEACE Jiang 1/10/2018 2:22 PM

## 2018-01-10 NOTE — OB LABOR/OXYTOCIN SAFETY PROGRESS
Labor Progress Note - Marina Weller 28 y o  female MRN: [id number]    Unit/Bed#: L&D 324-01 Encounter: [id number]    Obstetric History       T0      L0     SAB0   TAB0   Ectopic0   Multiple0   Live Births0      Gestational Age: 39w6d     Contraction Frequency (minutes): 2-3  Contraction Quality: Moderate  Tachysystole: No   Dilation: 10  Dilation Complete Date: 01/10/18  Dilation Complete Time: 1655  Effacement (%): 100  Station: 2  Baseline Rate: 145 bpm  Fetal Heart Rate: 128 BPM  FHR Category: Category I          Notes/comments:      Pt notes intermittent pelvic pressure  Will begin second stage of labor   Anticipate        Liliya Castro MD 1/10/2018 4:58 PM

## 2018-01-10 NOTE — PROGRESS NOTES
Chief Complaint  Patient presents for Tdap vaccine  Pt given injection in upper right arm and tolerated well  Lot [id number], Ex 07/26/2019  NDC [id number]  Given by [facility]      Active Problems    1  Adult hypothyroidism (244 9) (E03 9)   2  Excessive weight gain in pregnancy, third trimester (996 96,170 9) (O26 03)   3  Hypothyroid in pregnancy, antepartum, third trimester (648 13,244 9) (O99 283,E03 9)   4  Hypothyroidism due to Hashimoto's thyroiditis (244 8,245 2) (E03 8,E06 3)   5  Need for prophylactic vaccination and inoculation against influenza (V04 81) (Z23)   6  Need for Tdap vaccination (V06 1) (Z23)   7  Obesity affecting pregnancy in third trimester (649 13) (D25 129)   8  Overweight (BMI 25 0-29 9) (278 02) (E66 3)   9  Pregnancy resulting from in vitro fertilization in third trimester (V23 85) (O09 813)   10  Pregnancy with history of infertility in first trimester (V23 0) (O09 01)   11  Right thyroid nodule (241 0) (E04 1)   12  Threatened premature labor in second trimester (644 03) (O47 02)    Current Meds   1  Levothyroxine Sodium 175 MCG Oral Tablet; TAKE 1 TABLET DAILY; Therapy: 40YOV8620 to (Evaluate:06Ual2671)  Requested for: 83VSI0005; Last   Rx:14Nov2017; Status: ACTIVE - Renewal Denied Ordered   2  Prenatal Vitamin TABS; TAKE 1 TABLET DAILY; Therapy: (Recorded:19Jan2017) to Recorded    Allergies    1  No Known Drug Allergies    2  No Known Environmental Allergies   3  Nuts   4  VEGETABLES    Vitals  Signs    Systolic: 499  Diastolic: 70  Height: 5 ft 5 5 in  Weight: 226 lb   BMI Calculated: 37 04  BSA Calculated: 2 1    Assessment    1  Excessive weight gain in pregnancy, third trimester (114 34,980 9) (O26 03)   2  Hypothyroid in pregnancy, antepartum, third trimester (648 13,244 9) (O99 283,E03 9)   3  Obesity affecting pregnancy in third trimester (649 13) (X90 735)   4  Pregnancy resulting from in vitro fertilization in third trimester (V23 85) (O09 813)   5   Need for Tdap vaccination (V06 1) (Z23)    Plan  Need for Tdap vaccination    · Tdap (Adacel)    Future Appointments    Date/Time Provider Specialty Site   12/01/2017 09:30 AM CAESAR Richards  Obstetrics/Gynecology [address],[facility] Floor OB/GYN   12/18/2017 03:45 PM CAESAR Richards  Obstetrics/Gynecology [address],[facility] Floor OB/GYN   01/08/2018 10:00 AM CAESAR Richards  Obstetrics/Gynecology [address],[facility] Floor OB/GYN   12/05/2017 12:50 PM CAESAR Allison  Endocrinology [facility] ENDOCRINOLOGY   12/11/2017 03:45 PM CAESAR Tinoco  Obstetrics/Gynecology [address],[facility] Floor OB/GYN   12/26/2017 10:00 AM CAESAR Tinoco  Obstetrics/Gynecology [address],[facility] Floor OB/GYN   01/02/2018 10:00 AM CAESAR Tinoco   Obstetrics/Gynecology [address],[facility] Floor OB/GYN     Signatures   Electronically signed by : CAESAR Burnett ; Nov 17 2017 11:05AM EST                       (Author)

## 2018-01-10 NOTE — OB LABOR/OXYTOCIN SAFETY PROGRESS
Labor Progress Note - Marina Weller 28 y o  female MRN: [id number]    Unit/Bed#: L&D 324-01 Encounter: [id number]    Obstetric History       T0      L0     SAB0   TAB0   Ectopic0   Multiple0   Live Births0      Gestational Age: 39w6d     Contraction Frequency (minutes): 2-3  Contraction Quality: Moderate  Tachysystole: No   Dilation: 9        Effacement (%): 100  Station: 1  Baseline Rate: 140 bpm  Fetal Heart Rate: 128 BPM  FHR Category: Category I          Notes/comments:     Pt doing well, s/p spontaneous deceleration for less than 2 min,  from 140s to 100  It recovered with repositioning  Patient making excellent change   Will continue expectant management   D/W Dr Blair Cassette 1/10/2018 3:28 PM

## 2018-01-10 NOTE — ANESTHESIA PREPROCEDURE EVALUATION
Review of Systems/Medical History  Patient summary reviewed        Cardiovascular  Negative cardio ROS    Pulmonary  Negative pulmonary ROS ,        GI/Hepatic  Negative GI/hepatic ROS          Negative  ROS        Endo/Other  History of thyroid disease , hypothyroidism,      GYN       Hematology  Negative hematology ROS      Musculoskeletal  Negative musculoskeletal ROS        Neurology  Negative neurology ROS      Psychology   Negative psychology ROS            Physical Exam    Airway    Mallampati score: I  TM Distance: >3 FB  Neck ROM: full     Dental       Cardiovascular  Comment: Negative ROS, Rhythm: regular, Rate: normal, Cardiovascular exam normal    Pulmonary  Pulmonary exam normal     Other Findings        Anesthesia Plan  ASA Score- 2     Anesthesia Type- epidural with ASA Monitors  Additional Monitors:   Airway Plan:         Plan Factors-    Induction-     Postoperative Plan-     Informed Consent- Anesthetic plan and risks discussed with patient

## 2018-01-10 NOTE — ANESTHESIA PROCEDURE NOTES
Epidural Block    Patient location during procedure: OB  Start time: 1/10/2018 11:01 AM  Reason for block: at surgeon's request and primary anesthetic  Staffing  Anesthesiologist: Samm Damian  Performed: anesthesiologist   Preanesthetic Checklist  Completed: patient identified, site marked, surgical consent, pre-op evaluation, timeout performed, IV checked, risks and benefits discussed and monitors and equipment checked  Epidural  Patient position: sitting  Prep: Betadine  Patient monitoring: frequent blood pressure checks and continuous pulse ox  Approach: midline  Location: lumbar (1-5)  Injection technique: NICHOLE saline  Needle  Needle type: Tuohy   Needle gauge: 18 G  Catheter type: side hole  Catheter size: 20 G  Assessment  Sensory level: [id number] aspiration for CSF, negative aspiration for heme and no paresthesia on injection  patient tolerated the procedure well with no immediate complications

## 2018-01-10 NOTE — H&P
History & Physical - OB/GYN   Richard Mcleod 28 y o  female MRN: [id number]  Unit/Bed#: L&D 324-01 Encounter: [id number]    Chief complaint:  "I think my water broke"    HPI:  28 y o   at 40w5d weeks by [address] ([id number]) presents to labor with cc of leakage of fluid that started around 0600 when she was sleeping  Reports leakage was blood tinged but mostly clear in color  Also reports of 9/10 lower abdomen contractions and describes them as "cramps "   States this pregnancy is via IUI  She is a patient of Dr Nguyễn Tiwari and Dr Thereasa Dakin  Contractions:  yes  Fetal movement:  yes  Vaginal bleeding:   no  Leaking of fluid:  yes    Pregnancy Complications:  Patient Active Problem List   Diagnosis    Excessive weight gain during pregnancy in third trimester    Hypothyroid in pregnancy, antepartum, third trimester    Pregnancy resulting from in vitro fertilization in third trimester         Baby complications/comments: none    PMH:  Past Medical History:   Diagnosis Date    Hashimoto's thyroiditis        PSH:  Past Surgical History:   Procedure Laterality Date    ORIF HUMERUS FRACTURE         Social Hx:  Denies smoking cigarettes, drinking alcohol or drug abuse  OB Hx:  Obstetric History       T0      L0     SAB0   TAB0   Ectopic0   Multiple0   Live Births0       # Outcome Date GA Lbr Gonzalo/2nd Weight Sex Delivery Anes PTL Lv   1 Current                   Meds:  No current facility-administered medications on file prior to encounter        Current Outpatient Prescriptions on File Prior to Encounter   Medication Sig Dispense Refill    levothyroxine 175 mcg tablet Take 175 mcg by mouth daily      Prenatal Vit-Fe Fumarate-FA (PRENATAL VITAMIN PO) Take by mouth         Allergies:  No Known Allergies    ROS:  General ROS: negative for - chills or fever  Respiratory ROS: no cough, shortness of breath, or wheezing  Cardiovascular ROS: no chest pain or dyspnea on exertion  Gastrointestinal ROS: Gravid with pressure, but no change in bowel habits  Genito-Urinary ROS: no dysuria, trouble voiding, or hematuria    Prenatal Labs: reviewed     Labs:  Blood type: O+  Antibody: positive  Group B strep: negative  HIV: negative  Hepatitis B: negative  RPR: Immune  Rubella: Immune  Varicella Immune   1 hour Glucose: 100    Physical Exam:  /80   Pulse 78   Temp 98 2 °F (36 8 °C) (Oral)   Resp 18   Wt 109 kg (240 lb)   LMP 2017     Weight:  239 lb   Height:  5 foot 5 inches    HEENT: atraumatic, normocephalic  Heart: RRR, NMRG  Lungs: CTA b/l, no wrr  Abdomen: gravid, non-tender, non-distended  Extremities: non-tender, no edema    Estimated Fetal Weight: 8 lbs   Presentation: Vertex on US performed on 2018 at Dr Augusto Guerrero office    SVE: 2 / 90% / -1   SSE: Positive for pooling  Nitrazine: Positive  [place]: Positive  FHT:  135 / Moderate 6 - 25 bpm / accelerations  No decelerations  [place]: q4  Bedside Sono: VTX  THIAGO 10 36, MVP 2 68    Membranes: SROM    Quiles score: 6    Assessment:   28 y o   at 40w5d weeks SROM at 0600 today    Plan:   1  Admit to L&D  2  CBC, RPR, type and screen  3   Pain control with stadol and phenergan    Expectant Mgmnt     FEN: IV fluids, clear diet  PPx: Ambulate  Pain: analgesia/epidural per patient request    Code: Full code  Type: Inpatient, med surg bed    Discussed with Dr Olvin Geronimo and Dr Yashira Everett MD  9:05 AM  01/10/18

## 2018-01-11 RX ADMIN — IBUPROFEN 600 MG: 600 TABLET, FILM COATED ORAL at 09:34

## 2018-01-11 RX ADMIN — LEVOTHYROXINE SODIUM 175 MCG: 25 TABLET ORAL at 05:33

## 2018-01-11 RX ADMIN — Medication 1 TABLET: at 09:34

## 2018-01-11 RX ADMIN — OXYCODONE HYDROCHLORIDE AND ACETAMINOPHEN 1 TABLET: 5; 325 TABLET ORAL at 01:41

## 2018-01-11 RX ADMIN — DOCUSATE SODIUM 100 MG: 100 CAPSULE, LIQUID FILLED ORAL at 09:34

## 2018-01-11 RX ADMIN — DOCUSATE SODIUM 100 MG: 100 CAPSULE, LIQUID FILLED ORAL at 17:14

## 2018-01-11 RX ADMIN — IBUPROFEN 600 MG: 600 TABLET, FILM COATED ORAL at 17:14

## 2018-01-11 RX ADMIN — ACETAMINOPHEN 650 MG: 325 TABLET, FILM COATED ORAL at 22:21

## 2018-01-11 NOTE — RESULT NOTES
Message   Increase the levothyroxine from 88 Âµg a day to 100 Âµg a day  Repeat TSH and free T4 in 6 weeks      Ultrasound shows a small 0 8 cm nodule  Repeat a thyroid ultrasound in 6 months  Vitamin D is normal and all other lab work is within normal limits  If she wants , pls send her a copy of the results  Verified Results  (1) RENAL FUNCTION PANEL 11Jun2016 09:53AM [employer]     Test Name Result Flag Reference   GLUCOSE 78 mg/dL  65-99   Fasting reference interval   UREA NITROGEN (BUN) 11 mg/dL  7-25   CREATININE 0 64 mg/dL  0 50-1 10   eGFR NON-AFR   AMERICAN 122 mL/min/1 73m2  > OR = 60   eGFR AFRICAN AMERICAN 142 mL/min/1 73m2  > OR = 60   BUN/CREATININE RATIO   2-62   NOT APPLICABLE (calc)   SODIUM 142 mmol/L  135-146   POTASSIUM 4 1 mmol/L  3 5-5 3   CHLORIDE 109 mmol/L     CARBON DIOXIDE 25 mmol/L  19-30   CALCIUM 9 0 mg/dL  8 6-10 2   PHOSPHATE (AS PHOSPHORUS) 3 7 mg/dL  2 5-4 5   ALBUMIN 3 9 g/dL  3 6-5 1     (1) CBC/PLT/DIFF [id number] 09:53AM [employer]     Test Name Result Flag Reference   WHITE BLOOD CELL COUNT 3 9 Thousand/uL  3 8-10 8   RED BLOOD CELL COUNT 4 28 Million/uL  3 80-5 10   HEMOGLOBIN 12 2 g/dL  11 7-15 5   HEMATOCRIT 37 5 %  35 0-45 0   MCV 87 5 fL  80 0-100 0   MCH 28 4 pg  27 0-33 0   MCHC 32 5 g/dL  32 0-36 0   RDW 14 5 %  11 0-15 0   PLATELET COUNT 128 Thousand/uL  140-400   MPV 8 6 fL  7 5-11 5   ABSOLUTE NEUTROPHILS 2285 cells/uL  8471-5187   ABSOLUTE LYMPHOCYTES 1174 cells/uL  850-3900   ABSOLUTE MONOCYTES 246 cells/uL  200-950   ABSOLUTE EOSINOPHILS 156 cells/uL     ABSOLUTE BASOPHILS 39 cells/uL  0-200   NEUTROPHILS 58 6 %     LYMPHOCYTES 30 1 %     MONOCYTES 6 3 %     EOSINOPHILS 4 0 %     BASOPHILS 1 0 %       (1) HEPATIC FUNCTION PANEL 11Jun2016 09:53AM Renella Aster     Test Name Result Flag Reference   PROTEIN, TOTAL 6 1 g/dL  6 1-8 1   ALBUMIN 3 9 g/dL  3 6-5 1   GLOBULIN 2 2 g/dL (calc)  1 9-3 7   ALBUMIN/GLOBULIN RATIO 1 8 (calc)  1 0-2 5 BILIRUBIN, TOTAL 0 4 mg/dL  0 2-1 2   BILIRUBIN, DIRECT 0 1 mg/dL  < OR = 0 2   BILIRUBIN, INDIRECT 0 3 mg/dL (calc)  0 2-1 2   ALKALINE PHOSPHATASE 43 U/L     AST 13 U/L  10-30   ALT 8 U/L  6-29     (1) LIPID PANEL, FASTING [id number] 09:53AM [employer]     Test Name Result Flag Reference   CHOLESTEROL, TOTAL 130 mg/dL  125-200   HDL CHOLESTEROL 67 mg/dL  > OR = 46   TRIGLICERIDES 37 mg/dL  <925   LDL-CHOLESTEROL 56 mg/dL (calc)  <130   Desirable range <100 mg/dL for patients with CHD or  diabetes and <70 mg/dL for diabetic patients with  known heart disease  CHOL/HDLC RATIO 1 9 (calc)  < OR = 5 0   NON HDL CHOLESTEROL 63 mg/dL (calc)     Target for non-HDL cholesterol is 30 mg/dL higher than   LDL cholesterol target  (1) T4, FREE [id number] 09:53AM [employer]     Test Name Result Flag Reference   T4, FREE 1 1 ng/dL  0 8-1 8     (Q) TSH, 3RD GENERATION [id number] 09:53AM [facility]     Test Name Result Flag Reference   TSH 3 18 mIU/L     Reference Range                         > or = 20 Years  0 40-4 50                              Pregnancy Ranges            First trimester    0 26-2 66            Second trimester   0 55-2 73            Third trimester    0 43-2 91     *(Q) VITAMIN D, 25-HYDROXY, LC/MS/MS [id number] 09:53AM Yanelis Sins   REPORT COMMENT:  FASTING:YES     Test Name Result Flag Reference   VITAMIN D, 25-OH, TOTAL 34 ng/mL     Vitamin D Status         25-OH Vitamin D:     Deficiency:                    <20 ng/mL  Insufficiency:             20 - 29 ng/mL  Optimal:                 > or = 30 ng/mL     For 25-OH Vitamin D testing on patients on   D2-supplementation and patients for whom quantitation   of D2 and D3 fractions is required, the QuestAssureD(TM)  25-OH VIT D, (D2,D3), LC/MS/MS is recommended: order   code 75976 (patients >2yrs)  For more information on this test, go to:  http://WellTrackOne/faq/NQP314  (This link is being provided for informational/educational purposes only )     (Q) PTH, INTACT AND CALCIUM 11Jun2016 09:53AM Aurora Winter     Test Name Result Flag Reference   CALCIUM 9 0 mg/dL  8 6-10 2   PARATHYROID HORMONE,$INTACT 24 pg/mL  14-64   Interpretive Guide    Intact PTH           Calcium  ------------------    ----------           -------  Normal Parathyroid    Normal               Normal  Hypoparathyroidism    Low or Low Normal    Low  Hyperparathyroidism     Primary            Normal or High       High     Secondary          High                 Normal or Low     Tertiary           High                 High  Non-Parathyroid     Hypercalcemia      Low or Low Normal    High     (Q) TSH, 3RD GENERATION [id number] 09:53AM Aurora Maggy     Test Name Result Flag Reference   TSH 3 18 mIU/L     Reference Range                         > or = 20 Years  0 40-4 50                              Pregnancy Ranges            First trimester    0 26-2 66            Second trimester   0 55-2 73            Third trimester    0 43-2 91     *(Q) VITAMIN D, 25-HYDROXY, LC/MS/MS 11Jun2016 09:53AM Aurora Winter     Test Name Result Flag Reference   VITAMIN D, 25-OH, TOTAL 34 ng/mL     Vitamin D Status         25-OH Vitamin D:     Deficiency:                    <20 ng/mL  Insufficiency:             20 - 29 ng/mL  Optimal:                 > or = 30 ng/mL     For 25-OH Vitamin D testing on patients on   D2-supplementation and patients for whom quantitation   of D2 and D3 fractions is required, the QuestAssureD(TM)  25-OH VIT D, (D2,D3), LC/MS/MS is recommended: order   code 77326 (patients >2yrs)  For more information on this test, go to:  http://[employer]lr/faq/QPR456  (This link is being provided for   informational/educational purposes only )     (Q) HEMOGLOBIN A1c [id number] 09:53AM [employer]   REPORT COMMENT:  FASTING:YES     Test Name Result Flag Reference   HEMOGLOBIN A1c 5 5 % of total Hgb  <5 7   According to ADA guidelines, hemoglobin A1c <7 0%  represents optimal control in non-pregnant diabetic  patients  Different metrics may apply to specific  patient populations  Standards of Medical Care in    Diabetes Care  2013;36:s11-s66     For the purpose of screening for the presence of  diabetes  <5 7%       Consistent with the absence of diabetes  5 7-6 4%    Consistent with increased risk for diabetes              (prediabetes)  >or=6 5%    Consistent with diabetes     This assay result is consistent with a decreased risk  of diabetes  Currently, no consensus exists for use of hemoglobin  A1c for diagnosis of diabetes for children  (Q) THYROGLOBULIN PANEL [id number] 09:53AM Sharmaine Hamman     Test Name Result Flag Reference   THYROGLOBULIN ANTIBODIES 3 IU/mL H < or = 1   THYROGLOBULIN 0 5 ng/mL L    Reference Range:        Intact Thyroid   2 8-40 9        Athyrotic        <0 1                 Note: Abnormal flagging is based        on the reference interval for         patients with intact thyroid  This test was performed using the Bianca Louisa   chemiluminescent method  Values obtained from  different assay methods cannot be used  interchangeably  Thyroglobulin levels, regardless  of value, should not be interpreted as absolute  evidence of the presence or absence of disease  This specimen was found to contain anti-thyroglobulin  antibodies  The presence of these autoantibodies may  cause falsely low thyroglobulin values  In Tg-antibody  positive patients the thyroglobulin by tandem mass  spectrometry (test code 86397 - Thyroglobulin,   LC/MS/MS; or panel test code 90514 - Thyroid Cancer  (Thyroglobulin) Monitoring) test is recommended  because it has no interference from autoantibodies       (Q) THYROID PEROXIDASE ANTIBODIES [id number] 09:53AM Sharmaine Hamman     Test Name Result Flag Reference   THYROID PEROXIDASE$ANTIBODIES 644 IU/mL H <9     US THYROID [id number] 03:52PM Sharmaine Hamman TW Order Number: [id number]     Test Name Result Flag Reference   US THYROID (Report)     THYROID ULTRASOUND     INDICATION: 74-year-old with hypothyroidism  COMPARISON: None  TECHNIQUE:  Ultrasound of the thyroid was performed with a high frequency linear transducer in transverse and sagittal planes including volumetric imaging sweeps as well as traditional still imaging technique  FINDINGS:   Thyroid parenchyma is diffusely heterogeneous in echotexture with focal nodule(s) as described below  Right gland: 5 9 x 1 7 x 1 7 cm  Right lower pole  0 8 x 0 7 x 0 8 cm  Solid hypoechoic nodule  Slightly irregular margins  No calcifications  Nodule is not taller than it is wide  No priors for comparison  Left gland: 5 0 x 1 3 x 1 5 cm  No dominant nodules  Isthmus: 0 4 cm in AP dimension  No dominant nodules  IMPRESSION:      Right lower pole hypoechoic nodule  This does not meet current American Thyroid Association criteria for requiring biopsy at this time  Continued follow-up recommended  Reference: 2015 American Thyroid Association Management Guidelines for Adult Patients with Thyroid Nodules and Differentiated Thyroid Cancer  Thyroid, Volume 26, Number 1, 2016                Workstation performed: [id number]     Signed by:   Deana Fernandez MD   6/10/16

## 2018-01-11 NOTE — LACTATION NOTE
This note was copied from a baby's chart  CONSULT - LACTATION  Baby Boy Marco Antonio Vazquez 1 days male MRN: [id number]    Ruth [address] [facility] NURSERY Room / Bed: L&D 311(N)/L&D 311(N) Encounter: [id number]    Maternal Information     MOTHER:  Juliet aBzzi  Maternal Age: 28 y o    OB History: #: 1, Date: 01/10/18, Sex: Male, Weight: 3273 g (7 lb 3 5 oz), GA: 40w5d, Delivery: Vaginal, Spontaneous Delivery, Apgar1: 8, Apgar5: 9, Living: Living, Birth Comments: None   Previouse breast reduction surgery? No    Lactation history:   Has patient previously breast fed: No   How long had patient previously breast fed:     Previous breast feeding complications:       Past Surgical History:   Procedure Laterality Date    ORIF HUMERUS FRACTURE         Birth information:  YOB: 2019   Time of birth: 6:56 PM   Sex: male   Delivery type: Vaginal, Spontaneous Delivery   Birth Weight: 3273 g (7 lb 3 5 oz)   Percent of Weight Change: 0%     Gestational Age: 39w6d   [unfilled]    Assessment     Breast and nipple assessment: did not assess at this time     Assessment: did not assess at this time    Feeding assessment: feeding well  LATCH:  Latch: Repeated attempts, hold nipple in mouth, stimulate to suck   Audible Swallowing: Spontaneous and intermittent (24 hours old)   Type of Nipple: Everted (After stimulation)   Comfort (Breast/Nipple): Soft/non-tender   Hold (Positioning): Full assist, staff holds infant at breast   LATCH Score: 7          Feeding recommendations:  breast feed on demand     Breastfeeding booklet given and reviewed with mother  Mother verbalized breastfeeding is going well  Enc to call for assistance as needed,phone # given      Ramya Euceda RN 2018 9:34 AM

## 2018-01-11 NOTE — PROGRESS NOTES
Postpartum Progress Note - OB/GYN   Ainsley Mon 28 y o  female MRN: [id number]  Unit/Bed#: L&D 311-01 Encounter: [id number]    ASSESSMENT:  Ainsley Mon 28 y o  Shaheen Ards s/p Spontaneous Vaginal Delivery Postpartum day  1  Pt is well appearing and with no current complaints  PLAN:  1) Continue routine postpartum care  2) Encourage ambulation  3) Pain control as needed  4) Advance diet as tolerated  5) Dispo: stable and comfortable    Subjective/Objective     SUBJECTIVE:  Pain: no  Tolerating Oral Intake: yes   Voiding: yes  Flatus: yes  Bowel Movement: no  Ambulating: yes  Breastfeeding: yes  Chest Pain: no  Shortness of Breath: no  Leg Pain/Discomfort: no  Lochia: minimal    OBJECTIVE:     Vitals: Blood pressure 95/55, pulse 87, temperature 98 6 °F (37 °C), temperature source Oral, resp  rate 18, height 5' 7" (1 702 m), weight 109 kg (240 lb), last menstrual period 03/30/2017, currently breastfeeding       General: appears well, alert and oriented x 3, pleasant and cooperative  Cardiovascular: RRR, normal S1/S2, no GRM  Lungs:  clear to auscultation bilaterally; no WRR, non-labored breathing   Abdomen: normal bowel sounds, soft, no tenderness, no distention  : Uterine fundus firm: -2 cm below the umbilicus  Lower Extremities: Non-tender, peripheral pulses normal; no clubbing, cyanosis, or edema    Labs:   Lab Results   Component Value Date    WBC 11 80 (H) 01/10/2018    HGB 12 9 01/10/2018    HCT 37 7 01/10/2018    MCV 87 01/10/2018     01/10/2018       Medications:   Current Facility-Administered Medications   Medication Dose Route Frequency    acetaminophen (TYLENOL) tablet 650 mg  650 mg Oral Q6H PRN    benzocaine-menthol-lanolin-aloe (DERMOPLAST) 20-0 5 % topical spray   Topical 4x Daily PRN    calcium carbonate (TUMS) chewable tablet 1,000 mg  1,000 mg Oral Daily PRN    diphenhydrAMINE (BENADRYL) injection 25 mg  25 mg Intravenous Q6H PRN    docusate sodium (COLACE) capsule 100 mg  100 mg Oral BID    hydrocortisone 1 % cream 1 application  1 application Topical PRN    ibuprofen (MOTRIN) tablet 600 mg  600 mg Oral Q6H PRN    lactated ringers infusion  125 mL/hr Intravenous Continuous    levothyroxine tablet 175 mcg  175 mcg Oral Early Morning    ondansetron (ZOFRAN) injection 4 mg  4 mg Intravenous Q8H PRN    oxyCODONE-acetaminophen (PERCOCET) 5-325 mg per tablet 1 tablet  1 tablet Oral Q4H PRN    oxyCODONE-acetaminophen (PERCOCET) 5-325 mg per tablet 2 tablet  2 tablet Oral Q4H PRN    prenatal multivitamin tablet 1 tablet  1 tablet Oral Daily    simethicone (MYLICON) chewable tablet 80 mg  80 mg Oral 4x Daily PRN    witch hazel-glycerin (TUCKS) topical pad 1 pad  1 pad Topical PRN     Invasive Devices          No matching active lines, drains, or airways               Holly Khan MD PGY-2   1/11/2018 6:07 AM

## 2018-01-11 NOTE — L&D DELIVERY NOTE
DELIVERY NOTE  Danilo Becker 28 y o  female MRN: [id number]  Unit/Bed#: L&D 324-01 Encounter: [id number]    Obstetrician: Dr Mauri Glover    Assistant: Dr Gus Acosta [employer] Resident)    Pre-Delivery Diagnosis:   Term pregnancy at 36 5/7n weeks  Spontaneous labor  Single fetus  SROM  Excessive weight gain during pregnancy  Hypothyroidism  IUI    Post-Delivery Diagnosis:   Same as above - Delivered  Viable male fetus  2nd degree perineal laceration  Left Periurethral laceration  Left Sulcus laceration    Procedure:  Spontaneous vaginal delivery  Repair 2nd degree perineal laceration, Left periurethral and Left sulcus lacerations      Specimens:   Cord blood obtained, placenta   1  Arterial cord pH 7 345 Base excess -2 6   2  Venous cord pH 7 383 Base excess -1 6    Estimated Blood Loss:    848           Complications:    None apparent    Description of Delivery:   Complete Time:  Pushing Time: 6228  Time of Delivery: [address]  Delivery of Placenta: Kaitlin Islas is 28year old  at 39w6d gestation who initially came to L&D with cc of LOF  She SROM'd at 0600  On SVE she was noted to be 2/-1  She was admitted for labor  She received epidural for anesthesia  She made constant cervical change through out the day and did not need augmentation  She delivered a viable Male  and sustained a Left periuretheral, Left sulcus and 2nd degree perineal laceration  No Nuchal cord was noted  With the assistance of maternal expulsive efforts and downward traction of fetal head, the anterior shoulder was delivered without difficulty, followed by the remainder of the infant's body  After delivery of the , the umbilical cord was doubly clamped and cut and the  was passed off to  staff for routine care  Umbilical cord blood and umbilical artery and venous gases were collected   Placenta was delivered with fundal massage and gentle traction on the cord with active management of the third stage of labor  Placenta delivered intact with a 3-vessel cord  Active management of the third stage of labor was undertaken with IV pitocin  Bleeding was noted to be under control  APGARS were 8, 9 at 1 and 5 minutes, respectively   weighed 7lb 3 5oz at birth  Inspection of the perineum, vagina, labia, and urethra revealed a 2nd degree perineal laceration and Left sulcus and Left periurethral lacerations which were then repaired in standard fashion with 2-0 Vicryl Rapide  The lacerations showed good tissue reapproximation and hemostasis  Mother and baby are currently recovering in stable condition  I agree with the operative report as dictated by Dr Audrey Peters and edited by me   I was present for the entire procedure  Ramon Drummond MD

## 2018-01-11 NOTE — DISCHARGE SUMMARY
Discharge Summary -   Keyla Amaya 28 y o  female MRN: [id number]  Unit/Bed#: L&D 324-01 Encounter: [id number]    Admission Date: 1/10/2018     Discharge Date:18    Attending: Debbie Peraza    Principal Diagnosis:   Term pregnancy 40 5/7      Secondary Diagnosis:   Excessive weight gain during pregnancy  Hypothyroidism  Pregnancy via intrauterine insemination      Procedures: Spontaneous vaginal delivery  Repair 2nd degree perienal laceration   Repair Left sulcus and left periuretheral lacerations     Hospital Course: John Perez is 28year old  at 39w6d gestation who initially came to L&D complaining of of spontaneous rupture of membranes ~ 0600  On exam her cervix was noted to be 2/90/-1  She was admitted  She received epidural for anesthesia  She made consistant cervical change through out the day and did not need augmentation  She delivered a viable male  on 01/10/2018 at 1856  Weight 7lbs 3 5oz via normal spontaneous vaginal delivery  She sustained a 2nd degree perineal laceration and left sulcus and left periuretheral lacerations during delivery which were apprpriately repaired  Apgars were 8 (1 min) and 9 (5 min)   was transferred to  nursery  Patient tolerated the procedure well  Her post-delivery course was uncomplicated  Her postpartum pain was well controlled with oral analgesics  On day of discharge, she was ambulating and able to reasonably perform all ADLs  She was voiding and had appropriate bowel function  Pain was well controlled  She was discharged home on postpartum day #2 without complications  Patient was instructed to follow up with her OB as an outpatient and was given appropriate warnings to call provider if she develops signs of infection or uncontrolled pain         Complications: None    Condition at discharge: stable     Discharge Medications:  Refer to AVS    Discharge instructions/Information to patient and family:   See after visit summary for information provided to patient and family  Provisions for Follow-Up Care:  See after visit summary for information related to follow-up care and any pertinent home health orders  Disposition: See After Visit Summary for discharge disposition information      Planned Readmission: No    Barrie Jc MD  OB/GYN PGY-2  1/12/2018 6:46 AM

## 2018-01-11 NOTE — LACTATION NOTE
This note was copied from a baby's chart  Assisted mom with breastfeeding  Baby has been sleepy the last 5 hours  Demo  to mom how to awaken baby and he woke up a little  I demo  football hold and proper latching and baby took a while, but then he latched fairly well  He did well, but needed enc to stay awake

## 2018-01-12 VITALS
DIASTOLIC BLOOD PRESSURE: 69 MMHG | OXYGEN SATURATION: 99 % | WEIGHT: 240 LBS | RESPIRATION RATE: 16 BRPM | TEMPERATURE: 97.9 F | HEIGHT: 67 IN | HEART RATE: 67 BPM | BODY MASS INDEX: 37.67 KG/M2 | SYSTOLIC BLOOD PRESSURE: 118 MMHG

## 2018-01-12 VITALS
HEIGHT: 66 IN | SYSTOLIC BLOOD PRESSURE: 122 MMHG | DIASTOLIC BLOOD PRESSURE: 78 MMHG | BODY MASS INDEX: 36 KG/M2 | WEIGHT: 224 LBS

## 2018-01-12 VITALS
WEIGHT: 196 LBS | HEIGHT: 66 IN | DIASTOLIC BLOOD PRESSURE: 82 MMHG | SYSTOLIC BLOOD PRESSURE: 121 MMHG | BODY MASS INDEX: 31.5 KG/M2

## 2018-01-12 VITALS
DIASTOLIC BLOOD PRESSURE: 70 MMHG | SYSTOLIC BLOOD PRESSURE: 120 MMHG | BODY MASS INDEX: 36.32 KG/M2 | HEIGHT: 66 IN | WEIGHT: 226 LBS

## 2018-01-12 RX ORDER — DOCUSATE SODIUM 100 MG/1
100 CAPSULE, LIQUID FILLED ORAL 2 TIMES DAILY
Qty: 10 CAPSULE | Refills: 0
Start: 2018-01-12 | End: 2018-01-12 | Stop reason: HOSPADM

## 2018-01-12 RX ORDER — IBUPROFEN 600 MG/1
600 TABLET ORAL EVERY 6 HOURS PRN
Qty: 30 TABLET | Refills: 0
Start: 2018-01-12 | End: 2018-02-27

## 2018-01-12 RX ORDER — CALCIUM CARBONATE 200(500)MG
1000 TABLET,CHEWABLE ORAL DAILY PRN
Refills: 0
Start: 2018-01-12 | End: 2018-01-12 | Stop reason: HOSPADM

## 2018-01-12 RX ORDER — SIMETHICONE 80 MG
80 TABLET,CHEWABLE ORAL 4 TIMES DAILY PRN
Qty: 30 TABLET | Refills: 0
Start: 2018-01-12 | End: 2018-01-12 | Stop reason: HOSPADM

## 2018-01-12 RX ORDER — DIAPER,BRIEF,INFANT-TODD,DISP
1 EACH MISCELLANEOUS 2 TIMES DAILY
Qty: 30 G | Refills: 0
Start: 2018-01-12 | End: 2018-02-27

## 2018-01-12 RX ADMIN — Medication 1 TABLET: at 09:23

## 2018-01-12 RX ADMIN — DOCUSATE SODIUM 100 MG: 100 CAPSULE, LIQUID FILLED ORAL at 09:23

## 2018-01-12 RX ADMIN — LEVOTHYROXINE SODIUM 175 MCG: 25 TABLET ORAL at 06:07

## 2018-01-12 RX ADMIN — IBUPROFEN 600 MG: 600 TABLET, FILM COATED ORAL at 02:30

## 2018-01-12 NOTE — LACTATION NOTE
This note was copied from a baby's chart  Met with mother to go over feeding log since birth for the first week  Emphasized 8 or more (12) feedings in a 24 hour period, what to expect for the number of diapers per day of life and the progression of properties of the  stooling pattern  Discussed s/s that breastfeeding is going well after day 4 and when to get help from a pediatrician or lactation support person after day 4  Booklet included Breast Pumping Instructions, When You Go Back to Work or School, and Breastfeeding Resources for after discharge including access to the number for the [employer]  Mother verbalized breastfeeding is going well, but she is getting sore  Discussed the importance of getting a deep latch  Gave her breast cream and gel pads with instructions on use for both  Enc to call for assistance as needed,phone # given

## 2018-01-12 NOTE — PROGRESS NOTES
2017         RE: Larisa Coy                                  To: CAESAR Burnett    MR#: [id number]                                    [address] [facility]  [address]   : MAR [address] Elizabet Amaya, [address]   ENC: [id number]                             Fax: [phone]   (Exam #: [id number])      The LMP of this 28year old,  G1, P0-0-0-0 patient was MAR 30 2017, her   working KARAN is 2018 and the current gestational age is 12 weeks 6   days by LMP and EDC by [facility]  A sonographic examination was performed on   2017 using real time equipment  The ultrasound examination was   performed using abdominal technique  The patient has a BMI of 32 6  Her   blood pressure today was 121/82  Earliest ultrasound found in her record: 17  8w6d  18 KARAN Multiple   longitudinal and transverse sections revealed a hernandez intrauterine   pregnancy with the fetus in variable presentation  The placenta is   posterior in implantation, grade 0 in appearance  Cardiac motion was observed at 161 bpm       INDICATIONS      first trimester genetic screening   hypothyroidism      Exam Types      Level I      RESULTS      Fetus # 1 of 1   Variable presentation      MEASUREMENTS (* Included In Average GA)      CRL              6 8 cm        12 weeks 6 days*   Nuchal Trans    1 80 mm      THE AVERAGE GESTATIONAL AGE is 12 weeks 6 days +/- 7 days  ANATOMY COMMENTS      Anatomic detail is limited at this gestational age  The yolk sac was not   noted  The fetal cranium appeared normal in shape and the nuchal   translucency was normal in size (1 8mm)  The nasal bone appears to be   present  The intracranial anatomy was unremarkable  Evaluation of the   spine revealed no obvious evidence for a neural tube defect  Anatomy of   the fetal thorax appeared within normal limits  The cardiac rhythm was   regular    Within the abdomen, stomach & bladder were visualized and the   abdominal wall appeared intact  A three vessel cord appears to be present  Active movement of the fetal body & extremities was seen  There is no   suspicion of a subchorionic bleed  The placental cord insertion was   normal  There is no suspicion of a uterine myoma  Free fluid is not seen   in the posterior cul-de-sac  ADNEXA      The left ovary appeared normal and measured 2 8 x 2 9 x 1 8 cm with a   volume of 7 6 cc  The right ovary appeared normal and measured 3 2 x 3 0 x   2 0 cm with a volume of 10 0 cc       AMNIOTIC FLUID         Largest Vertical Pocket = 4 4 cm   Amniotic Fluid: Normal      IMPRESSION      Jose IUP   12 weeks and 6 days by this ultrasound  (KARAN=JAN 5 2018)   12 weeks and 6 days by LMP and EDC by [facility]  (KARAN=JAN 5 2018)   Variable presentation   Regular fetal heart rate of 161 bpm   Posterior placenta      CONSULT COMMENT      Thank you very much for requesting a consultation was very nice patient   for the indication of genetic screening  This is the patient's first   pregnancy  The patient had intrauterine insemination due to a same-sex   relationships  She has no significant Surgical history other than   orthopedic surgery  Her medical history is significant for hypothyroidism   managed by an endocrinologist   She denies the current use of tobacco,   alcohol, or drugs  Her medications currently include levothyroxin 175 ?g   daily and prenatal vitamins  She has no significant drug allergies  Her   family medical history is noncontributory  A review of systems is   otherwise negative  On exam, the patient appears well, in no acute   distress, and her abdomen is nontender        We discussed the options for genetic screening, including but not limited   to first trimester screening, second trimester screening, combined first   and second trimester screening, noninvasive prenatal testing (NIPT) for   patients at high risk and diagnostic screening through the use of CVS and   amniocentesis  We discussed the risks and benefits of each approach   including the sensitivities and false positive rates as well as the   difference between a screening test and a diagnostic test   At the   conclusion of our discussion the patient elected Stepwise Sequential   Screening to delineate her risk for fetal aneuploidy  She was given a   requisition to go to [facility] to have the first trimester blood work drawn  The first trimester portion of the screening results, encompassing age,   nuchal translucency, and biochemistry should be available within one week   of testing and will be reported from [facility]   The second stage of   sequential screening should be completed between the 15th and 21st week of   pregnancy (ideally between 16-18 weeks)  The patient and I discussed her current state of hypothyroidism with a TSH   level of 0 71  She states that she is having her TSH level checked   tomorrow  I recommend continuing to adjusting her Synthroid dose to a   TSH level with the trimester specific goal  in (mU/ML) as follows: First   trimester 0 1 to 2 5, Second trimester 0 2 to 3 0, Third trimester 0 3 to   3 0  I also recommend drawing a TSH level every 4-6 weeks and adjusting   her Synthroid dose accordingly as pregnancy can significantly increase   your requirements for thyroid hormones especially during the second   trimester  As long as the patient's hypothyroidism is well controlled,   she should not have any significant increased risk of adverse pregnancy   outcome  Poorly controlled maternal hypothyroidism does place  the   pregnancy at risk for preeclampsia, placental abruption, nonreassuring   fetal heart rate,  birth, low birthweight,  morbidity and   mortality, neuropsychological and cognitive impairment, and postpartum   hemorrhage  We discussed these risks and the importance of medication   adherence to achieve euthyroidism    As long as the patient's   hypothyroidism is well controlled and no issues arise during the   pregnancy, no deviation from normal labor and delivery is required  We discussed follow-up in detail and I recommend an anatomy ultrasound be   scheduled for 20 weeks gestation  Thank you very much for allowing us to participate in the care of this   very nice patient  Should you have any questions, please do not hesitate   to contact our office  Please note, in addition to the time spent discussing the results of the   ultrasound, I spent approximately 15 minutes of face-to-face time with the   patient, greater than 50% of which was spent in counseling and the   coordination of care for this patient  Portions of the record may have been created with voice recognition   software  Occasional wrong word or "sound a like" substitutions may have   occurred due to the inherent limitations of voice recognition software  Read the chart carefully and recognize, using context, where substitutions   have occurred  KIRSTY Hollis M D     Electronically signed 06/29/17 19:51

## 2018-01-12 NOTE — PROGRESS NOTES
Postpartum Progress Note - OB/GYN   Jimbo Gómez 28 y o  female MRN: [id number]  Unit/Bed#: L&D 311-01 Encounter: [id number]    ASSESSMENT:  Jimbo Gómez 28 y o  Jamie Cho s/p Spontaneous Vaginal Delivery Postpartum day  1  Pt is well appearing and with no current complaints  PLAN:  1) Continue routine postpartum care  2) Encourage ambulation  3) Pain control as needed  4) Advance diet as tolerated  5) Dispo: stable and comfortable, anticipate discharge today    Subjective/Objective     SUBJECTIVE:  Pain: no  Tolerating Oral Intake: yes   Voiding: yes  Flatus: yes  Bowel Movement: no  Ambulating: yes  Breastfeeding: yes  Chest Pain: no  Shortness of Breath: no  Leg Pain/Discomfort: no  Lochia: minimal    OBJECTIVE:     Vitals: Blood pressure 118/69, pulse 74, temperature 98 1 °F (36 7 °C), temperature source Oral, resp  rate 16, height 5' 7" (1 702 m), weight 109 kg (240 lb), last menstrual period 03/30/2017, SpO2 99 %, currently breastfeeding       General: appears well, alert and oriented x 3, pleasant and cooperative  Cardiovascular: RRR, normal S1/S2, no GRM  Lungs:  clear to auscultation bilaterally; no WRR, non-labored breathing   Abdomen: normal bowel sounds, soft, no tenderness, no distention  : Uterine fundus firm: -2 cm below the umbilicus  Lower Extremities: Non-tender, peripheral pulses normal; no clubbing, cyanosis, or edema    Labs:   Lab Results   Component Value Date    WBC 11 80 (H) 01/10/2018    HGB 12 9 01/10/2018    HCT 37 7 01/10/2018    MCV 87 01/10/2018     01/10/2018       Medications:   Current Facility-Administered Medications   Medication Dose Route Frequency    acetaminophen (TYLENOL) tablet 650 mg  650 mg Oral Q6H PRN    benzocaine-menthol-lanolin-aloe (DERMOPLAST) 20-0 5 % topical spray   Topical 4x Daily PRN    calcium carbonate (TUMS) chewable tablet 1,000 mg  1,000 mg Oral Daily PRN    diphenhydrAMINE (BENADRYL) injection 25 mg  25 mg Intravenous Q6H PRN    docusate sodium (COLACE) capsule 100 mg  100 mg Oral BID    hydrocortisone 1 % cream 1 application  1 application Topical PRN    ibuprofen (MOTRIN) tablet 600 mg  600 mg Oral Q6H PRN    lactated ringers infusion  125 mL/hr Intravenous Continuous    levothyroxine tablet 175 mcg  175 mcg Oral Early Morning    ondansetron (ZOFRAN) injection 4 mg  4 mg Intravenous Q8H PRN    oxyCODONE-acetaminophen (PERCOCET) 5-325 mg per tablet 1 tablet  1 tablet Oral Q4H PRN    oxyCODONE-acetaminophen (PERCOCET) 5-325 mg per tablet 2 tablet  2 tablet Oral Q4H PRN    prenatal multivitamin tablet 1 tablet  1 tablet Oral Daily    simethicone (MYLICON) chewable tablet 80 mg  80 mg Oral 4x Daily PRN    witch hazel-glycerin (TUCKS) topical pad 1 pad  1 pad Topical PRN     Invasive Devices          No matching active lines, drains, or airways               Justen Schmidt MD PGY-2   1/12/2018 6:45 AM

## 2018-01-12 NOTE — RESULT NOTES
Message   TSH has improved and within range, goal is <2 5 but has only been on current dose for 4 weeks, continue current dose, repeat TSH/free T4 in 4-6 weeks  Contact office with symptoms of hyper or hypo        Verified Results  (1) T4, FREE [id number] 10:05AM Wendi Huber     Test Name Result Flag Reference   T4, FREE 1 4 ng/dL  0 8-1 8     (Q) TSH, 3RD GENERATION [id number] 10:05AM Wendi Huber   REPORT COMMENT:  FASTING:UNKNOWN  AN UPDATE OR CORRECTION HAS BEEN MADE TO NAME     Test Name Result Flag Reference   TSH 3 03 mIU/L     Reference Range                         > or = 20 Years  0 40-4 50                              Pregnancy Ranges            First trimester    0 26-2 66            Second trimester   0 55-2 73            Third trimester    0 43-2 91

## 2018-01-12 NOTE — RESULT NOTES
Verified Results  (Q) STEPWISE, PART 2 [id number] 03:20PM Cruz Torres     Test Name Result Flag Reference   INTERPRETATION SEE NOTE     SCREEN NEGATIVE FOR OPEN NTD, DOWN SYNDROME AND TRISOMY 18   NT WAS USED IN THE RISK CALCULATIONS  RISK FOR ONTD 1:4900     AGE RISK DOWN SYNDROME 1:860     AMY DOWN SYNDROME RISK <1:5000  <1:270   RISK FOR TRISOMY 18 <1:5000  <1:100   CALCULATED GESTATIONAL$AGE 15 6     Crown rump length (CRL) was used to calculate gestational  age  KARAN, if provided, was not used for gestational age  dating  AFP, SERUM 27 5 ng/mL     AFP MOM 1 08     Reference Range:                                        <2 50                                        IDD        <1 90                                        TWINS      <4 00                                        TWINS IDD  <3 50                                        TRIPLETS   <4 50   HCG, SERUM 19 4 IU/mL     HCG MOM 0 59     ESTRIOL, FREE 0 60 ng/mL     ESTRIOL MOM 0 92     INHIBIN A, DIMERIC 105 pg/mL     INHIBIN A MOM 0 68     YAJAIRA A 822 5 ng/mL     This test was performed using a kit that has not been  cleared or approved by the FDA  The analytical performance  characteristics of this test have been determined by [facility]  This test  should not be used for diagnosis without confirmation by  other medically established means  YAJAIRA-A MOM 1 14     NT MOM 1 19     The maternal serum screening results indicate a lower risk  of trisomy 21 in this pregnancy  The nasal bone was assessed  via ultrasound and was present  The combined risk is  therefore likely to be less than the calculated risk  Other  findings later in the pregnancy may change the risk  Nasal bone assessment is best accomplished through a fetal  ultrasound performed between 11 weeks 0 days through 13  weeks 6 days   In assessing the risk for aneuploidy, the  evaluation of the maternal serum markers plus the nuchal  thickness measurement is calculated first  Any potential  change to the patient's risk for aneuploidy depends on the  nuchal thickness, crown-rump length, and the ethnic origin,  and therefore the values generated by the algorithm itself  will not change  Additional information about the assessment  of the fetal nasal bone may be found on the [facility] website at  http://www  fetalmedicine com/fmf/training-certification/cert  nfvvruvd-sd-rttjs  tence/11-13-week-scan/assessment-of-the-nasal-bone/  The Sequential Integrated Screen combines YAJAIRA-A and hCG  with or without a nuchal translucency measurement in the  first trimester with AFP, unconjugated estriol, intact hCG  and Inhibin A in the second trimester  This provides a  useful screening test for detection of open neural tube  defects, Down syndrome and Trisomy 18  It should be noted  that normal results can never guarantee the birth of a  normal baby and that 2 to 3 percent of newborns have some  type of physical or mental defect, many of which are  undetectable through any known prenatal diagnostic  technique  This is a screening test, not a diagnostic test      This risk assessment is based on demographic data provided  by the ordering physician  Please notify the laboratory  promptly if any data are incorrect  If you have questions concerning this report: For clinical consultation, call [phone]; For technical questions, call [phone] ext [phone]; For recalculations, fax to [phone]     REFERRING PHYSICIAN NAME [facility] PHYSICIAN PHONE [phone]     REFERRING PHYSICIAN MICHELLE [phone]     SPECIMEN # FROM PART 1 [id number]     DATE OF BIRTH 1990     COLLECTION DATE 07/18/2017     KARAN: 01/05/2018     NUCHAL TRANSLUCENCY 1 8 mm     MOTHER'S ETHNIC ORIGIN      INSULIN DEPEND DIABETIC NO     REPEAT SPECIMEN NO     NUMBER OF FETUSES 1     HX OF NEURAL TUBE DEFECTS NO     MATERNAL WEIGHT 199 lbs Crown Rump Length 68 mm     Ultrasound Date 06/29/2017     Nasal Bone PRESENT     Twin B Nasal Bone NOT GIVEN     For additional information, please refer to  http://[employer]/faq/FAQ19  (This link is provided for more informational/educational  purposes only )

## 2018-01-12 NOTE — PLAN OF CARE
Problem: Knowledge Deficit  Goal: Patient/family/caregiver demonstrates understanding of disease process, treatment plan, medications, and discharge instructions  Complete learning assessment and assess knowledge base    Interventions:  - Provide teaching at level of understanding  - Provide teaching via preferred learning methods   Outcome: Progressing      Problem: POSTPARTUM  Goal: Experiences normal postpartum course  INTERVENTIONS:  - Monitor maternal vital signs  - Assess uterine involution and lochia   Outcome: Progressing    Goal: Appropriate maternal -  bonding  INTERVENTIONS:  - Identify family support  - Assess for appropriate maternal/infant bonding   -Encourage maternal/infant bonding opportunities  - Referral to  or  as needed   Outcome: Progressing    Goal: Establishment of infant feeding pattern  INTERVENTIONS:  - Assess breast/bottle feeding  - Refer to lactation as needed   Outcome: Progressing    Goal: Incision(s), wounds(s) or drain site(s) healing without S/S of infection  INTERVENTIONS  - Assess and document risk factors for skin impairment   - Assess and document dressing, incision, wound bed, drain sites and surrounding tissue  - Initiate Nutrition services consult and/or wound management as needed   Outcome: Progressing      Problem: DISCHARGE PLANNING  Goal: Discharge to home or other facility with appropriate resources  INTERVENTIONS:  - Identify barriers to discharge w/patient and caregiver  - Arrange for needed discharge resources and transportation as appropriate  - Identify discharge learning needs (meds, wound care, etc )  - Arrange for interpretive services to assist at discharge as needed  - Refer to Case Management Department for coordinating discharge planning if the patient needs post-hospital services based on physician/advanced practitioner order or complex needs related to functional status, cognitive ability, or social support system   Outcome: Progressing      Problem: PAIN - ADULT  Goal: Verbalizes/displays adequate comfort level or baseline comfort level  Interventions:  - Encourage patient to monitor pain and request assistance  - Assess pain using appropriate pain scale  - Administer analgesics based on type and severity of pain and evaluate response  - Implement non-pharmacological measures as appropriate and evaluate response  - Consider cultural and social influences on pain and pain management  - Notify physician/advanced practitioner if interventions unsuccessful or patient reports new pain   Outcome: Progressing      Problem: INFECTION - ADULT  Goal: Absence or prevention of progression during hospitalization  INTERVENTIONS:  - Assess and monitor for signs and symptoms of infection  - Monitor lab/diagnostic results  - Monitor all insertion sites, i e  indwelling lines, tubes, and drains  - Monitor endotracheal (as able) and nasal secretions for changes in amount and color  - [place] appropriate cooling/warming therapies per order  - Administer medications as ordered  - Instruct and encourage patient and family to use good hand hygiene technique  - Identify and instruct in appropriate isolation precautions for identified infection/condition   Outcome: Progressing      Problem: SAFETY ADULT  Goal: Patient will remain free of falls  INTERVENTIONS:  - Assess patient frequently for physical needs  -  Identify cognitive and physical deficits and behaviors that affect risk of falls    -  [place] fall precautions as indicated by assessment   - Educate patient/family on patient safety including physical limitations  - Instruct patient to call for assistance with activity based on assessment  - Modify environment to reduce risk of injury  - Consider OT/PT consult to assist with strengthening/mobility   Outcome: Progressing    Goal: Maintain or return to baseline ADL function  INTERVENTIONS:  -  Assess patient's ability to carry out ADLs; assess patient's baseline for ADL function and identify physical deficits which impact ability to perform ADLs (bathing, care of mouth/teeth, toileting, grooming, dressing, etc )  - Assess/evaluate cause of self-care deficits   - Assess range of motion  - Assess patient's mobility; develop plan if impaired  - Assess patient's need for assistive devices and provide as appropriate  - Encourage maximum independence but intervene and supervise when necessary  ¯ Involve family in performance of ADLs  ¯ Assess for home care needs following discharge   ¯ Request OT consult to assist with ADL evaluation and planning for discharge  ¯ Provide patient education as appropriate   Outcome: Progressing    Goal: Maintain or return mobility status to optimal level  INTERVENTIONS:  - Assess patient's baseline mobility status (ambulation, transfers, stairs, etc )    - Identify cognitive and physical deficits and behaviors that affect mobility  - Identify mobility aids required to assist with transfers and/or ambulation (gait belt, sit-to-stand, lift, walker, cane, etc )  - [place] fall precautions as indicated by assessment  - Record patient progress and toleration of activity level on Mobility SBAR; progress patient to next Phase/Stage  - Instruct patient to call for assistance with activity based on assessment  - Request Rehabilitation consult to assist with strengthening/weightbearing, etc    Outcome: Progressing

## 2018-01-12 NOTE — PROGRESS NOTES
Chief Complaint  Pt presents for flu vaccination  Pt given injection in upper left arm and tolerated well  [id number], Ex 06/30/2018  Marie Whitney [id number]  Given by [facility]      Active Problems    1  Adult hypothyroidism (244 9) (E03 9)   2  Hypothyroidism affecting pregnancy in second trimester (648 13,244 9) (O99 282,E03 9)   3  Hypothyroidism due to Hashimoto's thyroiditis (244 8,245 2) (E03 8,E06 3)   4  Need for prophylactic vaccination and inoculation against influenza (V04 81) (Z23)   5  Obesity affecting pregnancy in second trimester (649 13) (U14 532)   6  Overweight (BMI 25 0-29 9) (278 02) (E66 3)   7  Pregnancy resulting from in vitro fertilization in second trimester (V23 85) (O09 812)   8  Pregnancy with history of infertility in first trimester (V23 0) (O09 01)   9  Right thyroid nodule (241 0) (E04 1)   10  Threatened premature labor in second trimester (644 03) (O47 02)    Current Meds   1  Levothyroxine Sodium 175 MCG Oral Tablet; TAKE 1 TABLET DAILY; Therapy: [id number] to (Evaluate:19Nov2017)  Requested for: 23Aug2017; Last   Rx:[id number]; Status: ACTIVE - Renewal Denied Ordered   2  Prenatal Vitamin TABS; TAKE 1 TABLET DAILY; Therapy: (Recorded:19Jan2017) to Recorded   3  Progesterone CAPS; 1 DAILY; Therapy: (Recorded:21Aug2017) to Recorded    Allergies    1  No Known Drug Allergies    2  No Known Environmental Allergies   3  Nuts   4  VEGETABLES    Vitals  Signs    Systolic: 297  Diastolic: 74  Height: 5 ft 5 5 in  Weight: 214 lb   BMI Calculated: 35 07  BSA Calculated: 2 05    Assessment    1  Threatened premature labor in second trimester (644 03) (O47 02)   2  Pregnancy resulting from in vitro fertilization in second trimester (V23 85) (I04 799)   3  Obesity affecting pregnancy in second trimester (649 13) (P27 279)   4   Need for prophylactic vaccination and inoculation against influenza (V04 81) (Z23)    Plan  Need for prophylactic vaccination and inoculation against influenza    · Fluzone Quadrivalent 9 MCG/STRAIN Intradermal Suspension Pen-injector;  INJECT 0 1 ML; To Be Done: [id number]    Future Appointments    Date/Time Provider Specialty Site   10/16/2017 08:00 AM CAESAR Pino   Obstetrics/Gynecology [address] OB/GYN     Signatures   Electronically signed by : CAESAR Almaguer ; Sep 27 2017  4:14PM EST                       (Author)

## 2018-01-12 NOTE — RESULT NOTES
Discussion/Summary   TSH and free T4 within normal, repeat TSH and free T4 in 4 weeks, she needs an appt  with Dr Ruslan Finch for follow-up        Verified Results  (1) T4, FREE [id number] 03:36PM Coral Barillas     Test Name Result Flag Reference   T4, FREE 1 2 ng/dL  0 8-1 8     (Q) TSH, 3RD GENERATION [id number] 03:36PM Coral Barillas     Test Name Result Flag Reference   TSH 0 42 mIU/L     Reference Range                         > or = 20 Years  0 40-4 50                              Pregnancy Ranges            First trimester    0 26-2 66            Second trimester   0 55-2 73            Third trimester    0 43-2 91

## 2018-01-12 NOTE — DISCHARGE INSTRUCTIONS
Vaginal Delivery   WHAT YOU SHOULD KNOW:   A vaginal delivery is the birth of your baby through your vagina (birth canal)  AFTER YOU LEAVE:   Medicines:  · NSAIDs  help decrease swelling and pain or fever  This medicine is available with or without a doctor's order  NSAIDs can cause stomach bleeding or kidney problems in certain people  If you take blood thinner medicine, always ask your healthcare provider if NSAIDs are safe for you  Always read the medicine label and follow directions  · Take your medicine as directed  Call your healthcare provider if you think your medicine is not helping or if you have side effects  Tell him if you are allergic to any medicine  Keep a list of the medicines, vitamins, and herbs you take  Include the amounts, and when and why you take them  Bring the list or the pill bottles to follow-up visits  Carry your medicine list with you in case of an emergency  Follow up with your primary healthcare provider:  Most women need to return 6 weeks after a vaginal delivery  Ask about how to care for your wounds or stitches  Write down your questions so you remember to ask them during your visits  Activity:  Rest as much as possible  Try to keep all activities short  You may be able to do some exercise soon after you have your baby  Talk with your primary healthcare provider before you start exercising  If you work outside the home, ask when you can return to your job  Kegel exercises:  Kegel exercises may help your vaginal and rectal muscles heal faster  You can do Kegel exercises by tightening and relaxing the muscles around your vagina  Kegel exercises help make the muscles stronger  Breast care:  When your milk comes in, your breasts may feel full and hard  Ask how to care for your breasts, even if you are not breastfeeding  Constipation:  Do not try to push the bowel movement out if it is too hard   High-fiber foods, extra liquids, and regular exercise can help you prevent constipation  Examples of high-fiber foods are fruit and bran  Prune juice and water are good liquids to drink  Regular exercise helps your digestive system work  You may also be told to take over-the-counter fiber and stool softener medicines  Take these items as directed  Hemorrhoids:  Pregnancy can cause severe hemorrhoids  You may have rectal pain because of the hemorrhoids  Ask how to prevent or treat hemorrhoids  Perineum care: Your perineum is the area between your vagina and anus  Keep the area clean and dry to help it heal and to prevent infection  Wash the area gently with soap and water when you bathe or shower  Rinse your perineum with warm water when you use the toilet  Your primary healthcare provider may suggest you use a warm sitz bath to help decrease pain  A sitz bath is a bathtub or basin filled to hip level  Stay in the sitz bath for 20 to 30 minutes, or as directed  Vaginal discharge: You will have vaginal discharge, called lochia, after your delivery  The lochia is bright red the first day or two after the birth  By the fourth day, the amount decreases, and it turns red-brown  Use a sanitary pad rather than a tampon to prevent a vaginal infection  It is normal to have lochia up to 8 weeks after your baby is born  Monthly periods: Your period may start again within 7 to 12 weeks after your baby is born  If you are breastfeeding, it may take longer for your period to start again  You can still get pregnant again even though you do not have your monthly period  Talk with your primary healthcare provider about a birth control method that will be good for you if you do not want to get pregnant  Mood changes: Many new mothers have some kind of mood changes after delivery  Some of these changes occur because of lack of sleep, hormone changes, and caring for a new baby  Some mood changes can be more serious, such as postpartum depression   Talk with your primary healthcare provider if you feel unable to care for yourself or your baby  Sexual activity:  You may need to avoid sex for 6 to 7 weeks after you have your baby  You may notice you have a decreased desire for sex, or sex may be painful  You may need to use a vaginal lubricant (gel) to help make sex more comfortable  Contact your primary healthcare provider if:   · You have heavy vaginal bleeding that fills 1 or more sanitary pads in 1 hour  · You have a fever  · Your pain does not go away, or gets worse  · The skin between your vagina and rectum is swollen, warm, or red  · You have swollen, hard, or painful breasts  · You feel very sad or depressed  · You feel more tired than usual      · You have questions or concerns about your condition or care  Seek care immediately or call 911 if:   · You have pus or yellow drainage coming from your vagina or wound  · You are urinating very little, or not at all  · Your arm or leg feels warm, tender, and painful  It may look swollen and red  · You feel lightheaded, have sudden and worsening chest pain, or trouble breathing  You may have more pain when you take deep breaths or cough, or you may cough up blood  © 2014 [address] is for End User's use only and may not be sold, redistributed or otherwise used for commercial purposes  All illustrations and images included in CareNotes® are the copyrighted property of [employer]  or Marcelo Coleman  The above information is an  only  It is not intended as medical advice for individual conditions or treatments  Talk to your doctor, nurse or pharmacist before following any medical regimen to see if it is safe and effective for you

## 2018-01-13 VITALS
SYSTOLIC BLOOD PRESSURE: 122 MMHG | WEIGHT: 214 LBS | BODY MASS INDEX: 34.39 KG/M2 | DIASTOLIC BLOOD PRESSURE: 74 MMHG | HEIGHT: 66 IN

## 2018-01-13 VITALS
SYSTOLIC BLOOD PRESSURE: 121 MMHG | DIASTOLIC BLOOD PRESSURE: 66 MMHG | BODY MASS INDEX: 33.27 KG/M2 | WEIGHT: 207 LBS | HEIGHT: 66 IN

## 2018-01-14 VITALS
HEIGHT: 66 IN | SYSTOLIC BLOOD PRESSURE: 120 MMHG | BODY MASS INDEX: 31.34 KG/M2 | DIASTOLIC BLOOD PRESSURE: 76 MMHG | WEIGHT: 195 LBS

## 2018-01-14 NOTE — RESULT NOTES
Discussion/Summary   1st trimester, TSH within range, continue current dose of Levothyroxine, repeat TSH/free T4 in 4 weeks  Please create a task to follow-up in 4 weeks on labs        Verified Results  (1) T4, FREE [id number] 03:32PCAESAR Marin     Test Name Result Flag Reference   T4, FREE 1 3 ng/dL  0 8-1 8     (Q) TSH, 3RD GENERATION 30Jun2017 03:32PM Loy Marin     Test Name Result Flag Reference   TSH 0 35 mIU/L L    Reference Range                         > or = 20 Years  0 40-4 50                              Pregnancy Ranges            First trimester    0 26-2 66            Second trimester   0 55-2 73            Third trimester    0 43-2 91

## 2018-01-14 NOTE — RESULT NOTES
Message   Continue same dose of levothyroxine that she is taking currently  Please repeat TSH and free T4 in 3 months followed by office visit  we can renew her medications       Verified Results  (1) T4, FREE 05Oct2016 03:32PCAESAR Garciayanelis Schrader     Test Name Result Flag Reference   T4, FREE 1 1 ng/dL  0 8-1 8     (Q) TSH, 3RD GENERATION [id number] 03:ABBY Garciayanelis Schrader   REPORT COMMENT:  FASTING:NO     Test Name Result Flag Reference   TSH 1 48 mIU/L     Reference Range                         > or = 20 Years  0 40-4 50                              Pregnancy Ranges            First trimester    0 26-2 66            Second trimester   0 55-2 73            Third trimester    0 43-2 91

## 2018-01-15 VITALS
HEIGHT: 68 IN | DIASTOLIC BLOOD PRESSURE: 70 MMHG | BODY MASS INDEX: 29.31 KG/M2 | SYSTOLIC BLOOD PRESSURE: 106 MMHG | HEART RATE: 66 BPM | WEIGHT: 193.38 LBS

## 2018-01-15 NOTE — RESULT NOTES
Discussion/Summary   PT has not been seen since Jan 2017, she needs appt to follow up, Thyroid test result is OK, continue current dose, but she needs appt to follow up , in order to continuation of care        Verified Results  (1) T4, FREE 14Oct2017 09:41AM Meryl Alfonso     Test Name Result Flag Reference   T4, FREE 1 3 ng/dL  0 8-1 8     (Q) TSH, 3RD GENERATION 14Oct2017 09:41AM Naty, Mrsiddharth     Test Name Result Flag Reference   TSH 0 56 mIU/L     Reference Range                         > or = 20 Years  0 40-4 50                              Pregnancy Ranges            First trimester    0 26-2 66            Second trimester   0 55-2 73            Third trimester    0 43-2 91

## 2018-01-15 NOTE — RESULT NOTES
Message   Please take thyroid medication properly  TSH is slightly abnormal  we recommende to see me or Tracey in the office       Verified Results  (1) T4, FREE [id number] 12:17PM Rej carlos Kayce     Test Name Result Flag Reference   T4, FREE 1 2 ng/dL  0 8-1 8     (Q) TSH, 3RD GENERATION [id number] 12:17PM [employer]     Test Name Result Flag Reference   TSH 5 28 mIU/L H    Reference Range                         > or = 20 Years  0 40-4 50                              Pregnancy Ranges            First trimester    0 26-2 66            Second trimester   0 55-2 73            Third trimester    0 43-2 91

## 2018-01-15 NOTE — MISCELLANEOUS
Message  she is now pregnant  current dose is 125 mcg, 7 5 tabs a week  now 2 week pregnant  Take new prescription of 175 mcg a day and check TSH FT4 once a month  Make sure call the office with results  make a fu appt in 1-2 months pilo/ Angelica [address]  Adult hypothyroidism    · From  Levothyroxine Sodium 125 MCG Oral Tablet TAKE 1 TABLET DAILY AS  DIRECTED extra 0 5 tab tablet every Saturday To Levothyroxine Sodium 175 MCG Oral  Tablet TAKE 1 TABLET DAILY  Hypothyroidism in pregnancy, first trimester    · (1) T4, FREE; Status:Active; Requested for:[id number];    · (1) TSH; Status:Active;  Requested for:[id number];     Signatures   Electronically signed by : CAESAR Conrad ; Apr 28 2017  4:36PM EST                       (Author)

## 2018-01-15 NOTE — RESULT NOTES
Message   TSH normal, if feeling fine, continue current Levothyroxine        Verified Results  (1) T4, FREE [id number] 09:46AM PrashantSemmes Second     Test Name Result Flag Reference   T4, FREE 1 3 ng/dL  0 8-1 8     (Q) TSH, 3RD GENERATION [id number] 09:46AM Claudetta Second     Test Name Result Flag Reference   TSH 0 97 mIU/L     Reference Range                         > or = 20 Years  0 40-4 50                              Pregnancy Ranges            First trimester    0 26-2 66            Second trimester   0 55-2 73            Third trimester    0 43-2 91

## 2018-01-15 NOTE — RESULT NOTES
Message   Take an extra pill of levothyroxine every saturday   repeat TSH and Ft4 in 6 weeks     Verified Results  (1) T4, FREE [id number] 08:28AM Stefan Pardo     Test Name Result Flag Reference   T4, FREE 1 2 ng/dL  0 8-1 8     (Q) TSH, 3RD GENERATION [id number] 08:28AM Stefan Pardo   REPORT COMMENT:  LAB REQ # [id number]  FASTING:NO     Test Name Result Flag Reference   TSH 2 69 mIU/L     Reference Range                         > or = 20 Years  0 40-4 50                              Pregnancy Ranges            First trimester    0 26-2 66            Second trimester   0 55-2 73            Third trimester    0 43-2 91

## 2018-01-17 NOTE — RESULT NOTES
Discussion/Summary   TSH and free T4 within normal, continue current dose of Levothyroxine, repeat TSH/free T4 in 4 weeks        Verified Results  (1) T4, FREE [id number] 10:39AM Aurora Maggy     Test Name Result Flag Reference   T4, FREE 1 5 ng/dL  0 8-1 8     (Q) TSH, 3RD GENERATION [id number] 10:39AM AuroraFTBpro     Test Name Result Flag Reference   TSH 0 71 mIU/L     Reference Range                         > or = 20 Years  0 40-4 50                              Pregnancy Ranges            First trimester    0 26-2 66            Second trimester   0 55-2 73            Third trimester    0 43-2 91       Signatures   Electronically signed by : RADHA Dias; Jun 1 2017  9:48AM EST                       (Author)

## 2018-01-22 VITALS
WEIGHT: 199 LBS | DIASTOLIC BLOOD PRESSURE: 70 MMHG | BODY MASS INDEX: 31.98 KG/M2 | HEIGHT: 66 IN | SYSTOLIC BLOOD PRESSURE: 122 MMHG

## 2018-01-22 VITALS
HEIGHT: 66 IN | DIASTOLIC BLOOD PRESSURE: 70 MMHG | SYSTOLIC BLOOD PRESSURE: 108 MMHG | WEIGHT: 222 LBS | BODY MASS INDEX: 35.68 KG/M2

## 2018-01-22 VITALS
WEIGHT: 199 LBS | DIASTOLIC BLOOD PRESSURE: 74 MMHG | SYSTOLIC BLOOD PRESSURE: 118 MMHG | HEIGHT: 66 IN | BODY MASS INDEX: 31.98 KG/M2

## 2018-01-22 VITALS
DIASTOLIC BLOOD PRESSURE: 70 MMHG | SYSTOLIC BLOOD PRESSURE: 110 MMHG | HEIGHT: 66 IN | WEIGHT: 208 LBS | BODY MASS INDEX: 33.43 KG/M2

## 2018-01-22 LAB — PLACENTA IN STORAGE: NORMAL

## 2018-01-23 VITALS
DIASTOLIC BLOOD PRESSURE: 60 MMHG | BODY MASS INDEX: 36.64 KG/M2 | HEIGHT: 66 IN | WEIGHT: 228 LBS | SYSTOLIC BLOOD PRESSURE: 102 MMHG

## 2018-01-24 VITALS
HEIGHT: 66 IN | BODY MASS INDEX: 38.09 KG/M2 | SYSTOLIC BLOOD PRESSURE: 120 MMHG | WEIGHT: 237 LBS | DIASTOLIC BLOOD PRESSURE: 68 MMHG

## 2018-01-24 VITALS
HEIGHT: 66 IN | SYSTOLIC BLOOD PRESSURE: 118 MMHG | WEIGHT: 232 LBS | BODY MASS INDEX: 37.28 KG/M2 | DIASTOLIC BLOOD PRESSURE: 70 MMHG

## 2018-01-24 VITALS
BODY MASS INDEX: 37.61 KG/M2 | SYSTOLIC BLOOD PRESSURE: 110 MMHG | DIASTOLIC BLOOD PRESSURE: 70 MMHG | WEIGHT: 234.03 LBS | HEIGHT: 66 IN

## 2018-01-24 VITALS
BODY MASS INDEX: 38.41 KG/M2 | WEIGHT: 239 LBS | DIASTOLIC BLOOD PRESSURE: 72 MMHG | HEIGHT: 66 IN | SYSTOLIC BLOOD PRESSURE: 124 MMHG

## 2018-01-24 VITALS
HEIGHT: 66 IN | SYSTOLIC BLOOD PRESSURE: 110 MMHG | DIASTOLIC BLOOD PRESSURE: 70 MMHG | BODY MASS INDEX: 36.8 KG/M2 | WEIGHT: 229 LBS

## 2018-01-24 VITALS
BODY MASS INDEX: 38.25 KG/M2 | DIASTOLIC BLOOD PRESSURE: 64 MMHG | WEIGHT: 238 LBS | SYSTOLIC BLOOD PRESSURE: 116 MMHG | HEIGHT: 66 IN

## 2018-01-31 RX ORDER — PNV NO.95/FERROUS FUM/FOLIC AC 28MG-0.8MG
1 TABLET ORAL DAILY
COMMUNITY
End: 2018-02-27

## 2018-01-31 RX ORDER — LEVOTHYROXINE SODIUM 175 UG/1
1 TABLET ORAL DAILY
COMMUNITY
Start: 2016-10-14 | End: 2018-02-27

## 2018-02-01 ENCOUNTER — OFFICE VISIT (OUTPATIENT)
Dept: ENDOCRINOLOGY | Facility: CLINIC | Age: 28
End: 2018-02-01
Payer: COMMERCIAL

## 2018-02-01 VITALS
SYSTOLIC BLOOD PRESSURE: 116 MMHG | WEIGHT: 219.7 LBS | HEART RATE: 68 BPM | HEIGHT: 66 IN | BODY MASS INDEX: 35.31 KG/M2 | DIASTOLIC BLOOD PRESSURE: 70 MMHG

## 2018-02-01 DIAGNOSIS — E06.3 HYPOTHYROIDISM DUE TO HASHIMOTO'S THYROIDITIS: Primary | ICD-10-CM

## 2018-02-01 DIAGNOSIS — E55.9 VITAMIN D DEFICIENCY: ICD-10-CM

## 2018-02-01 DIAGNOSIS — E03.8 HYPOTHYROIDISM DUE TO HASHIMOTO'S THYROIDITIS: Primary | ICD-10-CM

## 2018-02-01 PROBLEM — O99.283 HYPOTHYROID IN PREGNANCY, ANTEPARTUM, THIRD TRIMESTER: Status: RESOLVED | Noted: 2017-12-21 | Resolved: 2018-02-01

## 2018-02-01 PROBLEM — O26.03 EXCESSIVE WEIGHT GAIN DURING PREGNANCY IN THIRD TRIMESTER: Status: RESOLVED | Noted: 2017-12-21 | Resolved: 2018-02-01

## 2018-02-01 PROBLEM — O09.813 PREGNANCY RESULTING FROM IN VITRO FERTILIZATION IN THIRD TRIMESTER: Status: RESOLVED | Noted: 2017-12-21 | Resolved: 2018-02-01

## 2018-02-01 PROBLEM — E03.9 HYPOTHYROID IN PREGNANCY, ANTEPARTUM, THIRD TRIMESTER: Status: RESOLVED | Noted: 2017-12-21 | Resolved: 2018-02-01

## 2018-02-01 PROCEDURE — 99213 OFFICE O/P EST LOW 20 MIN: CPT | Performed by: INTERNAL MEDICINE

## 2018-02-01 NOTE — LETTER
February 1, 2018     Nimesh Hart [address] [place] [address]    Patient: Jsesie Smith   YOB: 1990   Date of Visit: 2/1/2018       Dear Dr Torsten Morin: Thank you for referring Rachna Chau to me for evaluation  Below are my notes for this consultation  If you have questions, please do not hesitate to call me  I look forward to following your patient along with you  Sincerely,        Kathy Pearce MD        CC: MD Kathy Joya MD  2/1/2018 12:00 PM  Sign at close encounter   Jessie Smith 28 y o  female MRN: [id number]    Encounter: [id number]      Assessment/Plan     Assessment: This is a 28y o -year-old female with vitamin D deficiency and hypothyroidism  Plan:  1  Hypothyroidism-she delivered a baby about three weeks ago  Check TSH and free T4 in about two weeks  Based on results, I will make changes to her regimen if necessary  The elevated total T4 is expected during and few days after delivery  2   Vitamin-D deficiency-continue replacement with 6000 units per day  3   Thyroid nodule-this really did not meet criteria for biopsy  I would repeat an ultrasound after her next visit  CC:   Hypothyroidism    History of Present Illness     HPI:  21-year-old woman with a history of hypothyroidism due to Hashimoto's thyroiditis who presents for follow-up  She delivered a baby boy three weeks ago  Her levothyroxine dose has been increased over the pregnancy  Today, she denies any symptoms of hypo or hyperthyroidism  For the vitamin-D deficiency, she is on replacement with 6000 units per day  She also has a right lower pole small thyroid nodule on ultrasound done in 2016  Review of Systems   Constitutional: Negative for chills and fever  Respiratory: Negative for shortness of breath  Cardiovascular: Negative for chest pain     Gastrointestinal: Negative for constipation, diarrhea, nausea and vomiting  All other systems reviewed and are negative  Historical Information   Past Medical History:   Diagnosis Date    Hashimoto's thyroiditis      Past Surgical History:   Procedure Laterality Date    ORIF HUMERUS FRACTURE       Social History   History   Alcohol Use    Yes     Comment: social     History   Drug Use No     History   Smoking Status    Never Smoker   Smokeless Tobacco    Never Used     Family History:   Family History   Problem Relation Age of Onset    Hypothyroidism Mother     Hypothyroidism Sister        Meds/Allergies   Current Outpatient Prescriptions   Medication Sig Dispense Refill    levothyroxine 175 mcg tablet Take 175 mcg by mouth daily      levothyroxine 175 mcg tablet Take 1 tablet by mouth daily      Prenatal Vit-Fe Fumarate-FA (PRENATAL VITAMIN PO) Take by mouth      benzocaine-menthol-lanolin-aloe (DERMOPLAST) 20-0 5 % topical spray Apply 1 application topically 4 (four) times a day as needed for mild pain  0    hydrocortisone 1 % cream Apply 1 application topically 2 (two) times a day 30 g 0    ibuprofen (MOTRIN) 600 mg tablet Take 1 tablet by mouth every 6 (six) hours as needed for mild pain 30 tablet 0    Prenatal Vit-Fe Fumarate-FA (PRENATAL VITAMIN) 27-0 8 MG TABS Take 1 tablet by mouth daily      witch hazel-glycerin (TUCKS) topical pad Apply 1 pad topically as needed for irritation 40 each 0     No current facility-administered medications for this visit  No Known Allergies    Objective   Vitals: Blood pressure 116/70, pulse 68, height 5' 6" (1 676 m), weight 99 7 kg (219 lb 11 2 oz), last menstrual period 03/30/2017, currently breastfeeding  Physical Exam   Constitutional: She is oriented to person, place, and time  She appears well-developed and well-nourished  No distress  HENT:   Head: Normocephalic and atraumatic  Mouth/Throat: Oropharynx is clear and moist and mucous membranes are normal  No oropharyngeal exudate     Eyes: Conjunctivae, EOM and lids are normal  Right eye exhibits no discharge  Left eye exhibits no discharge  No scleral icterus  Neck: Neck supple  No thyromegaly present  Cardiovascular: Normal rate, regular rhythm and normal heart sounds  Exam reveals no gallop and no friction rub  No murmur heard  Pulmonary/Chest: Effort normal and breath sounds normal  No respiratory distress  She has no wheezes  Abdominal: Soft  Bowel sounds are normal  She exhibits no distension  There is no tenderness  Musculoskeletal: Normal range of motion  She exhibits no edema, tenderness or deformity  Lymphadenopathy:        Head (right side): No occipital adenopathy present  Head (left side): No occipital adenopathy present  Right: No supraclavicular adenopathy present  Left: No supraclavicular adenopathy present  Neurological: She is alert and oriented to person, place, and time  No cranial nerve deficit  Skin: Skin is warm and intact  No rash noted  She is not diaphoretic  No erythema  Psychiatric: She has a normal mood and affect  Vitals reviewed  The history was obtained from the review of the chart, patient  Lab Results:   Lab Results   Component Value Date/Time    TSH 3RD GENERATON 0 43 12/11/2017 03:31 PM    TSH 3RD GENERATON 0 60 11/08/2017 02:59 PM    TSH 3RD GENERATON 0 56 10/14/2017 09:41 AM    Free T4 1 3 12/11/2017 03:31 PM    Free T4 1 4 11/08/2017 02:59 PM    Free T4 1 2 09/05/2017 03:36 PM    Most recent TSH done on January 19, 2018 was 1 63, total T4 12 6 which is slightly elevated and total T3 89  Imaging Studies:   Results for orders placed during the hospital encounter of 06/08/16   US thyroid    Impression    Right lower pole hypoechoic nodule  This does not meet current American Thyroid Association criteria for requiring biopsy at this time  Continued follow-up recommended          Reference: 2015 American Thyroid Association Management Guidelines for Adult Patients with Thyroid Nodules and Differentiated Thyroid Cancer  Thyroid, Volume 26, Number 1, 2016  Workstation performed: [id number]         I have personally reviewed pertinent reports  Portions of the record may have been created with voice recognition software  Occasional wrong word or "sound a like" substitutions may have occurred due to the inherent limitations of voice recognition software  Read the chart carefully and recognize, using context, where substitutions have occurred

## 2018-02-01 NOTE — PATIENT INSTRUCTIONS
Hypothyroidism   WHAT YOU NEED TO KNOW:   What is hypothyroidism? Hypothyroidism is a condition that develops when the thyroid gland does not make enough thyroid hormone  Thyroid hormones help control body temperature, heart rate, growth, and weight  What causes hypothyroidism? If you have a family member with hypothyroidism, your risk is increased  Any of the following can cause hypothyroidism:  · Autoimmune disease, such as inflammation of your thyroid, or Hashimoto disease    · Surgery, radiation therapy, or medicines such as lithium, sedatives, or narcotics    · Thyroid cancer or viral infection    · Low iodine levels  What are the signs and symptoms of hypothyroidism? The signs and symptoms may develop slowly, sometimes over several years  · Exhaustion    · Sensitivity to cold    · Headaches or decreased concentration    · Muscle aches or weakness    · Constipation     · Dry, flaky skin or brittle nails    · Thinning hair    · Heavy or irregular monthly periods    · Depression or irritability  How is hypothyroidism diagnosed? Your healthcare provider will ask about your symptoms and what medicines you take  He will ask about your medical history and if anyone in your family has hypothyroidism  A blood test will show your thyroid hormone level  How is hypothyroidism treated? Thyroid hormone replacement medicine may bring your thyroid hormone level back to normal  Ask your healthcare provider for more information on other medicines you may need  Call 911 for any of the following:   · You have sudden chest pain or shortness of breath  · You have a seizure  · You feel like you are going to faint  When should I seek immediate care? · You have diarrhea, tremors, or trouble sleeping  · Your legs, ankles, or feet are swollen  When should I contact my healthcare provider? · You have a fever  · You have chills, a cough, or feel weak and achy      · You have pain and swelling in your muscles and joints  · Your skin is itchy, swollen, or you have a rash  · Your signs and symptoms return or get worse, even after treatment  · You have questions or concerns about your condition or care  CARE AGREEMENT:   You have the right to help plan your care  Learn about your health condition and how it may be treated  Discuss treatment options with your caregivers to decide what care you want to receive  You always have the right to refuse treatment  The above information is an  only  It is not intended as medical advice for individual conditions or treatments  Talk to your doctor, nurse or pharmacist before following any medical regimen to see if it is safe and effective for you  © 2017 [address] [facility] Information is for End User's use only and may not be sold, redistributed or otherwise used for commercial purposes  All illustrations and images included in CareNotes® are the copyrighted property of [employer] M [employer]  or Marcelo Coleman

## 2018-02-01 NOTE — PROGRESS NOTES
Bry Ramos 28 y o  female MRN: [id number]    Encounter: [id number]      Assessment/Plan     Assessment: This is a 28y o -year-old female with vitamin D deficiency and hypothyroidism  Plan:  1  Hypothyroidism-she delivered a baby about three weeks ago  Check TSH and free T4 in about two weeks  Based on results, I will make changes to her regimen if necessary  The elevated total T4 is expected during and few days after delivery  2   Vitamin-D deficiency-continue replacement with 6000 units per day  3   Thyroid nodule-this really did not meet criteria for biopsy  I would repeat an ultrasound after her next visit  CC:   Hypothyroidism    History of Present Illness     HPI:  79-year-old woman with a history of hypothyroidism due to Hashimoto's thyroiditis who presents for follow-up  She delivered a baby boy three weeks ago  Her levothyroxine dose has been increased over the pregnancy  Today, she denies any symptoms of hypo or hyperthyroidism  For the vitamin-D deficiency, she is on replacement with 6000 units per day  She also has a right lower pole small thyroid nodule on ultrasound done in 2016  Review of Systems   Constitutional: Negative for chills and fever  Respiratory: Negative for shortness of breath  Cardiovascular: Negative for chest pain  Gastrointestinal: Negative for constipation, diarrhea, nausea and vomiting  All other systems reviewed and are negative        Historical Information   Past Medical History:   Diagnosis Date    Hashimoto's thyroiditis      Past Surgical History:   Procedure Laterality Date    ORIF HUMERUS FRACTURE       Social History   History   Alcohol Use    Yes     Comment: social     History   Drug Use No     History   Smoking Status    Never Smoker   Smokeless Tobacco    Never Used     Family History:   Family History   Problem Relation Age of Onset    Hypothyroidism Mother     Hypothyroidism Sister        Meds/Allergies Current Outpatient Prescriptions   Medication Sig Dispense Refill    levothyroxine 175 mcg tablet Take 175 mcg by mouth daily      levothyroxine 175 mcg tablet Take 1 tablet by mouth daily      Prenatal Vit-Fe Fumarate-FA (PRENATAL VITAMIN PO) Take by mouth      benzocaine-menthol-lanolin-aloe (DERMOPLAST) 20-0 5 % topical spray Apply 1 application topically 4 (four) times a day as needed for mild pain  0    hydrocortisone 1 % cream Apply 1 application topically 2 (two) times a day 30 g 0    ibuprofen (MOTRIN) 600 mg tablet Take 1 tablet by mouth every 6 (six) hours as needed for mild pain 30 tablet 0    Prenatal Vit-Fe Fumarate-FA (PRENATAL VITAMIN) 27-0 8 MG TABS Take 1 tablet by mouth daily      witch hazel-glycerin (TUCKS) topical pad Apply 1 pad topically as needed for irritation 40 each 0     No current facility-administered medications for this visit  No Known Allergies    Objective   Vitals: Blood pressure 116/70, pulse 68, height 5' 6" (1 676 m), weight 99 7 kg (219 lb 11 2 oz), last menstrual period 03/30/2017, currently breastfeeding  Physical Exam   Constitutional: She is oriented to person, place, and time  She appears well-developed and well-nourished  No distress  HENT:   Head: Normocephalic and atraumatic  Mouth/Throat: Oropharynx is clear and moist and mucous membranes are normal  No oropharyngeal exudate  Eyes: Conjunctivae, EOM and lids are normal  Right eye exhibits no discharge  Left eye exhibits no discharge  No scleral icterus  Neck: Neck supple  No thyromegaly present  Cardiovascular: Normal rate, regular rhythm and normal heart sounds  Exam reveals no gallop and no friction rub  No murmur heard  Pulmonary/Chest: Effort normal and breath sounds normal  No respiratory distress  She has no wheezes  Abdominal: Soft  Bowel sounds are normal  She exhibits no distension  There is no tenderness  Musculoskeletal: Normal range of motion   She exhibits no edema, tenderness or deformity  Lymphadenopathy:        Head (right side): No occipital adenopathy present  Head (left side): No occipital adenopathy present  Right: No supraclavicular adenopathy present  Left: No supraclavicular adenopathy present  Neurological: She is alert and oriented to person, place, and time  No cranial nerve deficit  Skin: Skin is warm and intact  No rash noted  She is not diaphoretic  No erythema  Psychiatric: She has a normal mood and affect  Vitals reviewed  The history was obtained from the review of the chart, patient  Lab Results:   Lab Results   Component Value Date/Time    TSH 3RD GENERATON 0 43 12/11/2017 03:31 PM    TSH 3RD GENERATON 0 60 11/08/2017 02:59 PM    TSH 3RD GENERATON 0 56 10/14/2017 09:41 AM    Free T4 1 3 12/11/2017 03:31 PM    Free T4 1 4 11/08/2017 02:59 PM    Free T4 1 2 09/05/2017 03:36 PM    Most recent TSH done on January 19, 2018 was 1 63, total T4 12 6 which is slightly elevated and total T3 89  Imaging Studies:   Results for orders placed during the hospital encounter of 06/08/16   US thyroid    Impression    Right lower pole hypoechoic nodule  This does not meet current American Thyroid Association criteria for requiring biopsy at this time  Continued follow-up recommended  Reference: 2015 American Thyroid Association Management Guidelines for Adult Patients with Thyroid Nodules and Differentiated Thyroid Cancer  Thyroid, Volume 26, Number 1, 2016  Workstation performed: [id number]         I have personally reviewed pertinent reports  Portions of the record may have been created with voice recognition software  Occasional wrong word or "sound a like" substitutions may have occurred due to the inherent limitations of voice recognition software  Read the chart carefully and recognize, using context, where substitutions have occurred

## 2018-02-07 RX ORDER — LEVOTHYROXINE SODIUM 175 UG/1
TABLET ORAL
Qty: 90 TABLET | Refills: 0 | OUTPATIENT
Start: 2018-02-07

## 2018-02-16 ENCOUNTER — TELEPHONE (OUTPATIENT)
Dept: OBGYN CLINIC | Facility: CLINIC | Age: 28
End: 2018-02-16

## 2018-02-16 NOTE — TELEPHONE ENCOUNTER
Patient called us this morning to reschedule her post partum visit to an earlier time  Pt mentioned that she did get our messages regarding her ED scale score  She states she did not have a chance to call us back earlier  She is feeling ok, still overwhelmed and teary eyed sometimes  She is not having any harmful thoughts, just very tired and overwhelmed  I spoke with Pt about how all of those feelings are normal and she can come in and talk to either of you anytime  She appreciated the talk and stated that she is doing okay for right now but will call the office if anything changes    BS

## 2018-02-25 LAB
T4 FREE SERPL-MCNC: 2.1 NG/DL (ref 0.8–1.8)
TSH SERPL-ACNC: 0.01 MIU/L

## 2018-02-26 RX ORDER — PRENATAL VIT,CAL 76/IRON/FOLIC 29 MG-1 MG
1 TABLET ORAL DAILY
Refills: 11 | COMMUNITY
Start: 2018-01-19 | End: 2018-02-27

## 2018-02-26 RX ORDER — LEVOTHYROXINE SODIUM 0.12 MG/1
125 TABLET ORAL
COMMUNITY
End: 2018-02-27

## 2018-02-26 NOTE — PROGRESS NOTES
Please call the patient regarding her abnormal result  TSH is a little low in the free T4 is elevated  Decrease levothyroxine to 150 mcg per day  Check TSH and free T4 in six weeks

## 2018-02-27 ENCOUNTER — OFFICE VISIT (OUTPATIENT)
Dept: OBGYN CLINIC | Facility: CLINIC | Age: 28
End: 2018-02-27

## 2018-02-27 VITALS — BODY MASS INDEX: 35.19 KG/M2 | WEIGHT: 218 LBS | SYSTOLIC BLOOD PRESSURE: 110 MMHG | DIASTOLIC BLOOD PRESSURE: 70 MMHG

## 2018-02-27 PROBLEM — E04.1 RIGHT THYROID NODULE: Status: ACTIVE | Noted: 2017-01-19

## 2018-02-27 PROBLEM — E55.9 VITAMIN D DEFICIENCY: Status: RESOLVED | Noted: 2018-02-01 | Resolved: 2018-02-27

## 2018-02-27 PROCEDURE — 99024 POSTOP FOLLOW-UP VISIT: CPT | Performed by: OBSTETRICS & GYNECOLOGY

## 2018-02-27 NOTE — PROGRESS NOTES
Patient is a 28 y o  Gonzalo Adrian with Patient's last menstrual period was 2017  who presents requesting evaluation of post partum  She reports she is breast and bottle feeding  She denies any breast problems  She reports her bleeding has resolved  She has not resumed sexual relations  She reports she has started walking, but no major exercise  She denies any problems post delivery  She does have a post partum depression score of 12  She reports she feels overwhelmed and she does not sleep adequately  SHe reports that baby wakes up every 2 hours  She also reports anxiety  She reports she is anxious about dropping the baby or that something may happen to him while she is resting  She reports supplementing with the bottle has helped alleviate some of the anxiety  She reports she has good support and does not desire any medication at this time  She returns to work tomorrow and will call if she feels her symptoms are worsening       Past Medical History:   Diagnosis Date    Chickenpox     Hashimoto's thyroiditis     IBS (irritable bowel syndrome)     last assessed 16    Late menarche     15    Obesity (BMI 30-39 9)     last assessed 16    Pregnancy         Vitamin D deficiency     last assessed 16       Past Surgical History:   Procedure Laterality Date    ORIF HUMERUS FRACTURE Left     TOOTH EXTRACTION      WTE       OB History    Para Term  AB Living   1 1 1     1   SAB TAB Ectopic Multiple Live Births         0 1      # Outcome Date GA Lbr Gonzalo/2nd Weight Sex Delivery Anes PTL Lv   1 Term 01/10/18 40w5d / 02:01 3273 g (7 lb 3 5 oz) M Vag-Spont EPI N BRANDON                Current Outpatient Prescriptions:     levothyroxine 175 mcg tablet, Take 175 mcg by mouth daily, Disp: , Rfl:     Nutritional Supplements (VITAMIN D MAINTENANCE PO), Take 6,000 Units by mouth daily, Disp: , Rfl:     Prenatal Vit-Fe Fumarate-FA (PRENATAL VITAMIN PO), Take by mouth, Disp: , Rfl:     Allergies Allergen Reactions    Nuts     Other        Social History     Social History    Marital status: /Civil Union     Spouse name: N/A    Number of children: N/A    Years of education: highschool graduate     Occupational History     heavy lifting      Social History Main Topics    Smoking status: Never Smoker    Smokeless tobacco: Never Used    Alcohol use No      Comment: social stopped with IUI    Drug use: No    Sexual activity: Yes     Partners: Female     Birth control/ protection: None     Other Topics Concern    None     Social History Narrative    Caffeine use 16 oz daily    Exercise habits minimal    No preference on religous beliefs                   Family History   Problem Relation Age of Onset    Hypothyroidism Mother     Diabetes Mother     Hypertension Mother     Thyroid disease Mother     Hypothyroidism Sister     Thyroid disease Sister     No Known Problems Father        Review of Systems   Constitutional: Negative for chills, fatigue, fever and unexpected weight change  HENT: Negative for congestion, mouth sores and sore throat  Respiratory: Negative for cough, chest tightness, shortness of breath and wheezing  Cardiovascular: Negative for chest pain and palpitations  Gastrointestinal: Negative for abdominal distention, abdominal pain, constipation, diarrhea, nausea and vomiting  Endocrine: Negative for cold intolerance and heat intolerance  Genitourinary: Negative for dyspareunia, dysuria, genital sores, menstrual problem, pelvic pain, vaginal bleeding, vaginal discharge and vaginal pain  Musculoskeletal: Negative for arthralgias  Skin: Negative for color change and rash  Neurological: Negative for dizziness, light-headedness and headaches  Hematological: Negative for adenopathy  Psychiatric/Behavioral: The patient is nervous/anxious          Blood pressure 110/70, weight 98 9 kg (218 lb), last menstrual period 03/30/2017, currently breastfeeding  and Body mass index is 35 19 kg/m²  Physical Exam   Constitutional: She is oriented to person, place, and time  She appears well-developed and well-nourished  HENT:   Head: Normocephalic and atraumatic  Eyes: Conjunctivae and EOM are normal    Neck: Normal range of motion  Neck supple  No tracheal deviation present  No thyromegaly present  Cardiovascular: Normal rate, regular rhythm and normal heart sounds  Pulmonary/Chest: Effort normal and breath sounds normal  No stridor  No respiratory distress  She has no wheezes  She has no rales  She exhibits no tenderness  Abdominal: Soft  Bowel sounds are normal  She exhibits no distension and no mass  There is no tenderness  There is no rebound and no guarding  Musculoskeletal: Normal range of motion  Lymphadenopathy:     She has no cervical adenopathy  Neurological: She is alert and oriented to person, place, and time  Skin: Skin is warm  Psychiatric: She has a normal mood and affect  Her behavior is normal  Judgment and thought content normal      Breasts: no masses noted, bruised nipples bilaterally  vulva: normal external genitalia for age and no lesions, masses, epithelial changes, or exudate  vagina: color pink, discharge  white and bleeding  without any bleeding  cervix: parous and no lesions   uterus: NSSC, AF, NT, mobile  adnexa: no masses or tenderness      A/P:  Pt is a 28 y o   with      Diagnoses and all orders for this visit:    Postpartum care and examination of lactating mother    f/u 3 months for annual or prn for concerns regarding depression

## 2018-02-27 NOTE — PATIENT INSTRUCTIONS
Depression, Ambulatory Care   GENERAL INFORMATION:   Depression  is a medical condition that causes feelings of sadness or hopelessness that do not go away  Depression may cause you to lose interest in things you used to enjoy  These feelings may interfere with your daily life  Common symptoms include the following:   · Appetite changes, or weight gain or loss    · Trouble going to sleep or staying asleep, or sleeping too much    · Fatigue or lack of energy    · Feeling restless, irritable, or withdrawn    · Feeling worthless, hopeless, discouraged, or very guilty    · Trouble concentrating, remembering things, doing daily tasks, or making decisions    · Thoughts about hurting or killing yourself  Seek immediate care for the following symptoms:   · You think about harming yourself or someone else  Treatment for depression  may include medicine to improve or balance your mood  Therapy may also be used to treat your depression  A therapist will help you learn to cope with your thoughts and feelings  Therapy can be done alone or in a group  It may also be done with family members or a significant other  Manage depression:   · Get regular physical activity  Try to exercise for 30 minutes, 3 to 5 days a week  Work with your healthcare provider to develop an exercise plan that you enjoy  · Get enough sleep  Create a routine to help you relax before bed  Try to go to bed and wake up at the same time every day  Sleep is important for emotional health  · Eat a variety of healthy foods  Healthy foods include fruits, vegetables, whole-grain breads, low-fat dairy products, beans, lean meats, and fish  A healthy meal plan is low in fat, salt, and added sugar  · Avoid or limit alcohol  Ask your healthcare provider how much alcohol is safe for you to drink  A drink of alcohol is 12 ounces of beer, 5 ounces of wine, or 1½ ounces of liquor  Follow up with your healthcare provider as directed:   You will need to return so your healthcare provider can monitor your progress  Write down your questions so you remember to ask them during your visits  CARE AGREEMENT:   You have the right to help plan your care  Learn about your health condition and how it may be treated  Discuss treatment options with your caregivers to decide what care you want to receive  You always have the right to refuse treatment  The above information is an  only  It is not intended as medical advice for individual conditions or treatments  Talk to your doctor, nurse or pharmacist before following any medical regimen to see if it is safe and effective for you  © 2014 [address] is for End User's use only and may not be sold, redistributed or otherwise used for commercial purposes  All illustrations and images included in CareNotes® are the copyrighted property of A D A M , Inc  or Marcelo Coleman

## 2018-03-05 DIAGNOSIS — E03.9 HYPOTHYROIDISM, UNSPECIFIED TYPE: Primary | ICD-10-CM

## 2018-03-05 RX ORDER — LEVOTHYROXINE SODIUM 0.15 MG/1
150 TABLET ORAL DAILY
Qty: 30 TABLET | Refills: 5 | Status: SHIPPED | OUTPATIENT
Start: 2018-03-05 | End: 2018-04-30

## 2018-03-07 NOTE — PROGRESS NOTES
Education  Baby & Me Education 3rd Trimester: Third Trimester Education provided:   benefits of breastfeeding, importance of exclusive breastfeeding, early initiation of breastfeeding, exclusive breastfeeding for the first 6 months, frequent feedings for optimal milk production, feeding on demand/baby-led feedings, effective positioning and attachment, importance of breastfeeding after 6 months following introduction of other foods, non-pharmacologic pain relief methods for labor and 28 week packet given  rooming-in on 24 hour basis Pregnancy Essentials Reference Guide given   The patient is planning on breastfeeding  The patient is planning on exclusively breastfeeding until the baby is 10months of age  Mother has not registered for prenatal class  Thought has been given to selecting a pediatrician     Prenatal education provided by: Roverto Diop      Signatures   Electronically signed by : CAESAR Gama ; Oct 16 2017 10:08AM EST                       (Author)

## 2018-03-07 NOTE — PROGRESS NOTES
Education  Baby & Me Education 2nd Trimester:   Second Trimester Education provided:   benefits of breastfeeding, importance of exclusive breastfeeding, early initiation of breastfeeding, exclusive breastfeeding for the first 6 months, Other education given: , non-pharmacologic pain relief methods for labor and rooming-in on 24 hour basis  Mother has not registered for prenatal class  Thought has not been given to selecting a pediatrician  The mother has not discussed personal preferences with her provider     Prenatal education provided by: Kelton Pittman      Signatures   Electronically signed by : CAESAR Li ; Aug 22 2017  7:21PM EST                       (Administrative)

## 2018-03-14 ENCOUNTER — TELEPHONE (OUTPATIENT)
Dept: ENDOCRINOLOGY | Facility: CLINIC | Age: 28
End: 2018-03-14

## 2018-03-14 DIAGNOSIS — E03.9 HYPOTHYROIDISM, UNSPECIFIED TYPE: Primary | ICD-10-CM

## 2018-03-14 NOTE — TELEPHONE ENCOUNTER
----- Message from Ash Collins MD sent at 2/26/2018  7:41 AM EST -----  Please call the patient regarding her abnormal result  TSH is a little low in the free T4 is elevated  Decrease levothyroxine to 150 mcg per day  Check TSH and free T4 in six weeks

## 2018-04-28 LAB
T4 FREE SERPL-MCNC: 1.6 NG/DL (ref 0.8–1.8)
TSH SERPL-ACNC: 0.01 MIU/L

## 2018-04-30 ENCOUNTER — TELEPHONE (OUTPATIENT)
Dept: ENDOCRINOLOGY | Facility: CLINIC | Age: 28
End: 2018-04-30

## 2018-04-30 DIAGNOSIS — E03.8 HYPOTHYROIDISM DUE TO HASHIMOTO'S THYROIDITIS: Primary | ICD-10-CM

## 2018-04-30 DIAGNOSIS — E03.8 ADULT ONSET HYPOTHYROIDISM: Primary | ICD-10-CM

## 2018-04-30 DIAGNOSIS — E06.3 HYPOTHYROIDISM DUE TO HASHIMOTO'S THYROIDITIS: Primary | ICD-10-CM

## 2018-04-30 RX ORDER — LEVOTHYROXINE SODIUM 0.12 MG/1
125 TABLET ORAL DAILY
Qty: 30 TABLET | Refills: 5 | Status: SHIPPED | OUTPATIENT
Start: 2018-04-30 | End: 2018-07-17 | Stop reason: SDUPTHER

## 2018-04-30 NOTE — TELEPHONE ENCOUNTER
----- Message from Anahy Mujica MD sent at 4/30/2018  8:52 AM EDT -----  Please call the patient regarding her abnormal result  TSH remains suppressed  The free T4 is high normal   Decrease levothyroxine to 125 mcg per day  Check TSH and free T4 in six weeks

## 2018-04-30 NOTE — PROGRESS NOTES
Please call the patient regarding her abnormal result  TSH remains suppressed  The free T4 is high normal   Decrease levothyroxine to 125 mcg per day  Check TSH and free T4 in six weeks

## 2018-07-12 LAB
T4 FREE SERPL-MCNC: 1.4 NG/DL (ref 0.8–1.8)
TSH SERPL-ACNC: 0.74 MIU/L

## 2018-07-16 ENCOUNTER — TELEPHONE (OUTPATIENT)
Dept: ENDOCRINOLOGY | Facility: CLINIC | Age: 28
End: 2018-07-16

## 2018-07-16 DIAGNOSIS — E03.8 ADULT ONSET HYPOTHYROIDISM: ICD-10-CM

## 2018-07-16 NOTE — TELEPHONE ENCOUNTER
----- Message from Katty Burch MD sent at 7/16/2018 10:47 AM EDT -----  The laboratory testing results are normal

## 2018-07-17 DIAGNOSIS — E03.8 ADULT ONSET HYPOTHYROIDISM: ICD-10-CM

## 2018-07-17 RX ORDER — LEVOTHYROXINE SODIUM 0.12 MG/1
125 TABLET ORAL DAILY
Qty: 30 TABLET | Refills: 0 | Status: SHIPPED | OUTPATIENT
Start: 2018-07-17 | End: 2018-08-10 | Stop reason: SDUPTHER

## 2018-07-17 RX ORDER — LEVOTHYROXINE SODIUM 0.12 MG/1
125 TABLET ORAL DAILY
Qty: 30 TABLET | Refills: 0 | Status: CANCELLED | OUTPATIENT
Start: 2018-07-17

## 2018-08-10 ENCOUNTER — OFFICE VISIT (OUTPATIENT)
Dept: ENDOCRINOLOGY | Facility: CLINIC | Age: 28
End: 2018-08-10
Payer: COMMERCIAL

## 2018-08-10 VITALS
SYSTOLIC BLOOD PRESSURE: 110 MMHG | HEIGHT: 66 IN | HEART RATE: 69 BPM | WEIGHT: 192.2 LBS | DIASTOLIC BLOOD PRESSURE: 74 MMHG | BODY MASS INDEX: 30.89 KG/M2

## 2018-08-10 DIAGNOSIS — E03.8 HYPOTHYROIDISM DUE TO HASHIMOTO'S THYROIDITIS: Primary | ICD-10-CM

## 2018-08-10 DIAGNOSIS — E03.8 ADULT ONSET HYPOTHYROIDISM: ICD-10-CM

## 2018-08-10 DIAGNOSIS — E04.1 RIGHT THYROID NODULE: ICD-10-CM

## 2018-08-10 DIAGNOSIS — E06.3 HYPOTHYROIDISM DUE TO HASHIMOTO'S THYROIDITIS: Primary | ICD-10-CM

## 2018-08-10 PROCEDURE — 99213 OFFICE O/P EST LOW 20 MIN: CPT | Performed by: NURSE PRACTITIONER

## 2018-08-10 RX ORDER — LEVOTHYROXINE SODIUM 0.12 MG/1
125 TABLET ORAL DAILY
Qty: 30 TABLET | Refills: 6 | Status: SHIPPED | OUTPATIENT
Start: 2018-08-10 | End: 2018-11-26 | Stop reason: SDUPTHER

## 2018-08-10 NOTE — PROGRESS NOTES
Established Patient Progress Note       Chief Complaint   Patient presents with    Hypothyroidism        History of Present Illness:     Danie Mosher is a 33 y o  female with a history of hypothyroidism and thyroid nodule  For the hypothyroidism she is currently taking levothyroxine 125 mcg daily  She reports she is taking this regularly and properly  Her most recent TSH was 0 74 and T4 was 1 4  She denies symptoms of hypo or hyperthyroidism  For the thyroid nodules she has not completed US in over two years  She denies neck pain, dysphonia, dysphagia, or dyspnea           Patient Active Problem List   Diagnosis    Hypothyroidism due to Hashimoto's thyroiditis    Right thyroid nodule    Postpartum care and examination of lactating mother      Past Medical History:   Diagnosis Date    Chickenpox     Hashimoto's thyroiditis     IBS (irritable bowel syndrome)     last assessed 16    Late menarche     15    Obesity (BMI 30-39 9)     last assessed 16    Pregnancy         Vitamin D deficiency     last assessed 16      Past Surgical History:   Procedure Laterality Date    ORIF HUMERUS FRACTURE Left     TOOTH EXTRACTION      WTE      Family History   Problem Relation Age of Onset    Hypothyroidism Mother     Diabetes Mother     Hypertension Mother     Thyroid disease Mother     Hypothyroidism Sister     Thyroid disease Sister     No Known Problems Father      Social History   Substance Use Topics    Smoking status: Never Smoker    Smokeless tobacco: Never Used    Alcohol use No      Comment: social stopped with IUI     Allergies   Allergen Reactions    Nuts     Other        Current Outpatient Prescriptions:     levothyroxine 125 mcg tablet, Take 1 tablet (125 mcg total) by mouth daily, Disp: 30 tablet, Rfl: 6    Nutritional Supplements (VITAMIN D MAINTENANCE PO), Take 6,000 Units by mouth daily, Disp: , Rfl:     Prenatal Vit-Fe Fumarate-FA (PRENATAL VITAMIN PO), Take by mouth, Disp: , Rfl:     Review of Systems   Constitutional: Negative for chills and fever  HENT: Negative for sore throat and trouble swallowing  Eyes: Negative for visual disturbance  Respiratory: Negative for shortness of breath  Cardiovascular: Negative for chest pain and palpitations  Gastrointestinal: Negative for abdominal pain, constipation and diarrhea  Endocrine: Negative for cold intolerance, heat intolerance, polydipsia, polyphagia and polyuria  Genitourinary: Negative for frequency  Musculoskeletal: Negative for arthralgias and myalgias  Skin: Negative for rash  Neurological: Negative for dizziness and tremors  Hematological: Negative for adenopathy  Psychiatric/Behavioral: Negative for sleep disturbance  All other systems reviewed and are negative  Physical Exam:  Body mass index is 31 02 kg/m²  /74   Pulse 69   Ht 5' 6" (1 676 m)   Wt 87 2 kg (192 lb 3 2 oz)   BMI 31 02 kg/m²    Wt Readings from Last 3 Encounters:   08/10/18 87 2 kg (192 lb 3 2 oz)   02/27/18 98 9 kg (218 lb)   02/01/18 99 7 kg (219 lb 11 2 oz)       Physical Exam   Constitutional: She is oriented to person, place, and time  She appears well-developed and well-nourished  No distress  HENT:   Head: Normocephalic and atraumatic  Eyes: Conjunctivae are normal  Pupils are equal, round, and reactive to light  Neck: Normal range of motion  Neck supple  No thyromegaly present  Cardiovascular: Normal rate, regular rhythm and normal heart sounds  Pulmonary/Chest: Effort normal and breath sounds normal  No respiratory distress  She has no wheezes  She has no rales  Abdominal: Soft  Bowel sounds are normal  She exhibits no distension  There is no tenderness  Musculoskeletal: Normal range of motion  She exhibits no edema  Neurological: She is alert and oriented to person, place, and time  Skin: Skin is warm and dry  Psychiatric: She has a normal mood and affect     Vitals reviewed  Labs:       Component      Latest Ref Rng & Units 7/11/2018   Free T4      0 8 - 1 8 ng/dL 1 4   TSH      mIU/L 0 74         Impression & Plan:    Problem List Items Addressed This Visit     Hypothyroidism due to Hashimoto's thyroiditis - Primary     Continue current dose of levothyroxine, check TSH and T4 prior to next visit         Relevant Medications    levothyroxine 125 mcg tablet    Other Relevant Orders    T4, free    TSH, 3rd generation    Right thyroid nodule     Check US of thyroid          Relevant Medications    levothyroxine 125 mcg tablet    Other Relevant Orders    US thyroid      Other Visit Diagnoses     Adult onset hypothyroidism        Relevant Medications    levothyroxine 125 mcg tablet    Other Relevant Orders    T4, free    TSH, 3rd generation          Orders Placed This Encounter   Procedures    US thyroid     Standing Status:   Future     Standing Expiration Date:   8/10/2019     Scheduling Instructions:      No prep required  Please bring your physician order, insurance cards, a form of photo ID and a list of your medications with you  Arrive 15 minutes prior to your appointment time in order to register  To schedule appointment please call Central Scheduling at [phone]  Order Specific Question:   Reason for Exam:     Answer:   thyroid nodules     Order Specific Question:   Is the patient pregnant? Answer:   No    T4, free     Standing Status:   Future     Number of Occurrences:   1     Standing Expiration Date:   8/10/2019    TSH, 3rd generation     This is a patient instruction: This test is non-fasting  Please drink two glasses of water morning of bloodwork  Standing Status:   Future     Number of Occurrences:   1     Standing Expiration Date:   8/10/2019       Discussed with the patient and all questioned fully answered  She will call me if any problems arise  Follow-up appointment in 6 months       Counseled patient on diagnostic results, prognosis, risk and benefit of treatment options, instruction for management, importance of treatment compliance, Risk  factor reduction and impressions      RADHA Rosario

## 2018-10-29 ENCOUNTER — TELEPHONE (OUTPATIENT)
Dept: ENDOCRINOLOGY | Facility: CLINIC | Age: 28
End: 2018-10-29

## 2018-10-29 NOTE — TELEPHONE ENCOUNTER
Patient requested to fax lab results from July faxed to Dr Marcus [facility] office [phone]    Results faxed

## 2018-11-06 ENCOUNTER — TELEPHONE (OUTPATIENT)
Dept: ENDOCRINOLOGY | Facility: CLINIC | Age: 28
End: 2018-11-06

## 2018-11-06 NOTE — TELEPHONE ENCOUNTER
Pt called wants to know if having grapefruit or oranges can interfere with medication she said she heard it can

## 2018-11-26 ENCOUNTER — TELEPHONE (OUTPATIENT)
Dept: ENDOCRINOLOGY | Facility: CLINIC | Age: 28
End: 2018-11-26

## 2018-11-26 DIAGNOSIS — E03.8 ADULT ONSET HYPOTHYROIDISM: ICD-10-CM

## 2018-11-26 RX ORDER — LEVOTHYROXINE SODIUM 0.12 MG/1
125 TABLET ORAL DAILY
Qty: 30 TABLET | Refills: 0 | Status: SHIPPED | OUTPATIENT
Start: 2018-11-26 | End: 2018-12-24 | Stop reason: DRUGHIGH

## 2018-11-26 NOTE — TELEPHONE ENCOUNTER
[facility] pharmacy called and lm stating that her Levothyroxine 125 mcg is on back order  They would like to know what other dosage you would like to rx in place of this? These are the doses that ARE available    25, 50, 75, 88, 137, 150, 175, 200 & 300  Please advise

## 2018-11-27 DIAGNOSIS — E03.9 HYPOTHYROIDISM, UNSPECIFIED TYPE: Primary | ICD-10-CM

## 2018-11-27 DIAGNOSIS — E03.8 ADULT ONSET HYPOTHYROIDISM: ICD-10-CM

## 2018-11-27 RX ORDER — LEVOTHYROXINE SODIUM 0.05 MG/1
TABLET ORAL
Qty: 30 TABLET | Refills: 1 | OUTPATIENT
Start: 2018-11-27 | End: 2018-12-24 | Stop reason: DRUGHIGH

## 2018-11-27 RX ORDER — LEVOTHYROXINE SODIUM 0.12 MG/1
125 TABLET ORAL DAILY
Qty: 30 TABLET | Refills: 5 | Status: SHIPPED | OUTPATIENT
Start: 2018-11-27 | End: 2019-03-20

## 2018-11-27 RX ORDER — LEVOTHYROXINE SODIUM 0.07 MG/1
TABLET ORAL
Qty: 30 TABLET | Refills: 1 | OUTPATIENT
Start: 2018-11-27 | End: 2018-12-24 | Stop reason: DRUGHIGH

## 2018-11-27 NOTE — TELEPHONE ENCOUNTER
Called pharmacy and informed that they can fill the 50 and the 75 1 each daily and for a 30 day supply with 1 refill each  They do not have a estimated restock date on the 125 mcg dosage  I will add this to pts med list so that it is on file in the meantime

## 2018-11-27 NOTE — TELEPHONE ENCOUNTER
The pharmacy needs you to tell them what the equivalent will be, what you would like to rx in its place to equal the 125   Please advise

## 2018-12-05 LAB
T4 FREE SERPL-MCNC: 1.2 NG/DL (ref 0.8–1.8)
TSH SERPL-ACNC: 3.16 MIU/L

## 2018-12-06 ENCOUNTER — TELEPHONE (OUTPATIENT)
Dept: ENDOCRINOLOGY | Facility: CLINIC | Age: 28
End: 2018-12-06

## 2018-12-06 DIAGNOSIS — E06.3 HYPOTHYROIDISM DUE TO HASHIMOTO'S THYROIDITIS: Primary | ICD-10-CM

## 2018-12-06 DIAGNOSIS — E03.8 HYPOTHYROIDISM DUE TO HASHIMOTO'S THYROIDITIS: Primary | ICD-10-CM

## 2018-12-06 NOTE — TELEPHONE ENCOUNTER
----- Message from [place] sent at 12/5/2018  1:14 PM EST -----  Please call the patient regarding her abnormal result  TSH high normal with normal free T4, repeat TSH and free T4 in 6 weeks

## 2018-12-24 DIAGNOSIS — E03.8 ADULT ONSET HYPOTHYROIDISM: ICD-10-CM

## 2018-12-24 RX ORDER — LEVOTHYROXINE SODIUM 0.12 MG/1
125 TABLET ORAL DAILY
Qty: 30 TABLET | Refills: 3 | Status: SHIPPED | OUTPATIENT
Start: 2018-12-24 | End: 2019-03-20

## 2019-03-20 ENCOUNTER — OFFICE VISIT (OUTPATIENT)
Dept: ENDOCRINOLOGY | Facility: CLINIC | Age: 29
End: 2019-03-20
Payer: COMMERCIAL

## 2019-03-20 VITALS
HEIGHT: 66 IN | WEIGHT: 181 LBS | HEART RATE: 61 BPM | BODY MASS INDEX: 29.09 KG/M2 | DIASTOLIC BLOOD PRESSURE: 80 MMHG | SYSTOLIC BLOOD PRESSURE: 118 MMHG

## 2019-03-20 DIAGNOSIS — E03.8 HYPOTHYROIDISM DUE TO HASHIMOTO'S THYROIDITIS: Primary | ICD-10-CM

## 2019-03-20 DIAGNOSIS — E06.3 HYPOTHYROIDISM DUE TO HASHIMOTO'S THYROIDITIS: Primary | ICD-10-CM

## 2019-03-20 DIAGNOSIS — E04.1 RIGHT THYROID NODULE: ICD-10-CM

## 2019-03-20 DIAGNOSIS — E03.8 ADULT ONSET HYPOTHYROIDISM: ICD-10-CM

## 2019-03-20 PROCEDURE — 99214 OFFICE O/P EST MOD 30 MIN: CPT | Performed by: NURSE PRACTITIONER

## 2019-03-20 RX ORDER — LEVOTHYROXINE SODIUM 0.12 MG/1
125 TABLET ORAL DAILY
Qty: 30 TABLET | Refills: 8 | Status: SHIPPED | OUTPATIENT
Start: 2019-03-20 | End: 2019-10-23 | Stop reason: DRUGHIGH

## 2019-03-20 NOTE — PROGRESS NOTES
Established Patient Progress Note       Chief Complaint   Patient presents with    Hypothyroidism        History of Present Illness:     Kylie Tello is a 33 y o  female with a history of hypothyroidism and thyroid nodule  For the hypothyroidism she is currently taking levothyroxine 125 mcg daily  She is taking this regularly and properly  Thyroid function test normal  She has no plans for future pregnancy at this time  Denies symptoms of hypo or hyperthyroidism  For the thyroid nodules she has not completed US  Last US was from 2016 showed right lower pole nodule  She denies neck pain, dysphonia, dysphagia, or dyspnea             Patient Active Problem List   Diagnosis    Hypothyroidism due to Hashimoto's thyroiditis    Right thyroid nodule    Postpartum care and examination of lactating mother      Past Medical History:   Diagnosis Date    Chickenpox     Hashimoto's thyroiditis     IBS (irritable bowel syndrome)     last assessed 16    Late menarche     14    Obesity (BMI 30-39 9)     last assessed 16    Pregnancy         Vitamin D deficiency     last assessed 16      Past Surgical History:   Procedure Laterality Date    ORIF HUMERUS FRACTURE Left     TOOTH EXTRACTION      WTE      Family History   Problem Relation Age of Onset    Hypothyroidism Mother     Diabetes Mother     Hypertension Mother     Thyroid disease Mother     Hypothyroidism Sister     Thyroid disease Sister     No Known Problems Father      Social History     Tobacco Use    Smoking status: Never Smoker    Smokeless tobacco: Never Used   Substance Use Topics    Alcohol use: No     Comment: social stopped with IUI     Allergies   Allergen Reactions    Nuts     Other        Current Outpatient Medications:     levothyroxine 125 mcg tablet, Take 1 tablet (125 mcg total) by mouth daily, Disp: 30 tablet, Rfl: 8    Nutritional Supplements (VITAMIN D MAINTENANCE PO), Take 6,000 Units by mouth daily, Disp: , Rfl:     Prenatal Vit-Fe Fumarate-FA (PRENATAL VITAMIN PO), Take by mouth, Disp: , Rfl:     Review of Systems   Constitutional: Negative for chills and fever  HENT: Negative for sore throat and trouble swallowing  Eyes: Negative for visual disturbance  Respiratory: Negative for shortness of breath  Cardiovascular: Negative for chest pain and palpitations  Gastrointestinal: Negative for abdominal pain, constipation and diarrhea  Endocrine: Negative for cold intolerance, heat intolerance, polydipsia, polyphagia and polyuria  Genitourinary: Negative for frequency  Musculoskeletal: Negative for arthralgias and myalgias  Skin: Negative for rash  Neurological: Negative for dizziness and tremors  Hematological: Negative for adenopathy  Psychiatric/Behavioral: Negative for sleep disturbance  All other systems reviewed and are negative  Physical Exam:  Body mass index is 29 21 kg/m²  /80   Pulse 61   Ht 5' 6" (1 676 m)   Wt 82 1 kg (181 lb)   BMI 29 21 kg/m²    Wt Readings from Last 3 Encounters:   03/20/19 82 1 kg (181 lb)   08/10/18 87 2 kg (192 lb 3 2 oz)   02/27/18 98 9 kg (218 lb)       Physical Exam   Constitutional: She is oriented to person, place, and time  She appears well-developed and well-nourished  No distress  HENT:   Head: Normocephalic and atraumatic  Eyes: Pupils are equal, round, and reactive to light  Conjunctivae are normal    Neck: Normal range of motion  Neck supple  No thyromegaly present  Cardiovascular: Normal rate, regular rhythm and normal heart sounds  Pulmonary/Chest: Effort normal and breath sounds normal  No respiratory distress  She has no wheezes  She has no rales  Abdominal: Soft  Bowel sounds are normal  She exhibits no distension  There is no tenderness  Musculoskeletal: Normal range of motion  She exhibits no edema  Neurological: She is alert and oriented to person, place, and time  Skin: Skin is warm and dry  Psychiatric: She has a normal mood and affect  Vitals reviewed  Labs:     Component      Latest Ref Rng & Units 12/4/2018   Free T4      0 8 - 1 8 ng/dL 1 2   TSH, POC      mIU/L 3 16       THYROID ULTRASOUND     INDICATION: 31year-old with hypothyroidism      COMPARISON: None      TECHNIQUE:   Ultrasound of the thyroid was performed with a high frequency linear transducer in transverse and sagittal planes including volumetric imaging sweeps as well as traditional still imaging technique      FINDINGS:  Thyroid parenchyma is diffusely heterogeneous in echotexture with focal nodule(s) as described below      Right gland:  5 9 x 1 7 x 1 7 cm  Right lower pole  0 8 x 0 7 x 0 8 cm  Solid hypoechoic nodule  Slightly irregular margins  No calcifications  Nodule is not taller than it is wide  No priors for comparison            Left gland:  5 0 x 1 3 x 1 5 cm  No dominant nodules           Isthmus: 0 4 cm in AP dimension  No dominant nodules           IMPRESSION:     Right lower pole hypoechoic nodule  This does not meet current American Thyroid Association criteria for requiring biopsy at this time  Continued follow-up recommended        Impression & Plan:    Problem List Items Addressed This Visit        Endocrine    Hypothyroidism due to Hashimoto's thyroiditis - Primary     Continue current dose of Levothyroxine, check TSH and free T4 prior to next visit         Relevant Medications    levothyroxine 125 mcg tablet    Other Relevant Orders    T4, free    TSH, 3rd generation    Right thyroid nodule     Have again ordered repeat US for continued surveillance of thyroid nodule  Stressed importance of having this completed            Relevant Medications    levothyroxine 125 mcg tablet    Other Relevant Orders    US thyroid      Other Visit Diagnoses     Adult onset hypothyroidism        Relevant Medications    levothyroxine 125 mcg tablet          Orders Placed This Encounter   Procedures    US thyroid Standing Status:   Future     Standing Expiration Date:   3/20/2020     Scheduling Instructions:      No prep required  Please bring your physician order, insurance cards, a form of photo ID and a list of your medications with you  Arrive 15 minutes prior to your appointment time in order to register  To schedule appointment please call Central Scheduling at [phone]  Order Specific Question:   Reason for Exam:     Answer:   thyroid nodules     Order Specific Question:   Is the patient pregnant? Answer:   No    T4, free    TSH, 3rd generation     This is a patient instruction: This test is non-fasting  Please drink two glasses of water morning of bloodwork  Discussed with the patient and all questioned fully answered  She will call me if any problems arise  Follow-up appointment in 6 months       Counseled patient on diagnostic results, prognosis, risk and benefit of treatment options, instruction for management, importance of treatment compliance, Risk  factor reduction and impressions      Devon Avina [address]

## 2019-03-20 NOTE — ASSESSMENT & PLAN NOTE
Have again ordered repeat US for continued surveillance of thyroid nodule  Stressed importance of having this completed

## 2019-04-18 LAB
T4 FREE SERPL-MCNC: 1.2 NG/DL (ref 0.8–1.8)
TSH SERPL-ACNC: 0.61 MIU/L

## 2019-04-24 ENCOUNTER — TELEPHONE (OUTPATIENT)
Dept: ENDOCRINOLOGY | Facility: CLINIC | Age: 29
End: 2019-04-24

## 2019-04-25 ENCOUNTER — TELEPHONE (OUTPATIENT)
Dept: ENDOCRINOLOGY | Facility: CLINIC | Age: 29
End: 2019-04-25

## 2019-04-30 ENCOUNTER — HOSPITAL ENCOUNTER (OUTPATIENT)
Dept: ULTRASOUND IMAGING | Facility: MEDICAL CENTER | Age: 29
Discharge: HOME | End: 2019-04-30
Payer: COMMERCIAL

## 2019-04-30 DIAGNOSIS — E04.1 RIGHT THYROID NODULE: ICD-10-CM

## 2019-04-30 PROCEDURE — 76536 US EXAM OF HEAD AND NECK: CPT

## 2019-05-07 ENCOUNTER — TELEPHONE (OUTPATIENT)
Dept: ENDOCRINOLOGY | Facility: CLINIC | Age: 29
End: 2019-05-07

## 2019-10-09 LAB
T4 FREE SERPL-MCNC: 1.2 NG/DL (ref 0.8–1.8)
TSH SERPL-ACNC: 3.37 MIU/L

## 2019-10-22 NOTE — PROGRESS NOTES
Established Patient Progress Note       Chief Complaint   Patient presents with    Hypothyroidism        History of Present Illness:     Eryn Chopra is a 26 y o  female with a history of hypothyroidism and thyroid nodule  For the hypothyroidism she is currently taking levothyroxine 125 mcg daily  She is taking this regularly and properly  Thyroid function test showed high normal TSH with normal free T4  She reports fatigue, weight gain, and hair loss  For the thyroid nodule she denies neck pain, dysphonia, dysphagia, or dyspnea         Patient Active Problem List   Diagnosis    Hypothyroidism due to Hashimoto's thyroiditis    Right thyroid nodule    Postpartum care and examination of lactating mother      Past Medical History:   Diagnosis Date    Chickenpox     Hashimoto's thyroiditis     IBS (irritable bowel syndrome)     last assessed 16    Late menarche     15    Obesity (BMI 30-39 9)     last assessed 16    Pregnancy         Vitamin D deficiency     last assessed 16      Past Surgical History:   Procedure Laterality Date    ORIF HUMERUS FRACTURE Left     TOOTH EXTRACTION      WTE      Family History   Problem Relation Age of Onset    Hypothyroidism Mother     Diabetes Mother     Hypertension Mother     Thyroid disease Mother     Hypothyroidism Sister     Thyroid disease Sister     No Known Problems Father      Social History     Tobacco Use    Smoking status: Never Smoker    Smokeless tobacco: Never Used   Substance Use Topics    Alcohol use: No     Comment: social stopped with IUI     Allergies   Allergen Reactions    Nuts     Other        Current Outpatient Medications:     Nutritional Supplements (VITAMIN D MAINTENANCE PO), Take 6,000 Units by mouth daily, Disp: , Rfl:     Prenatal Vit-Fe Fumarate-FA (PRENATAL VITAMIN PO), Take by mouth, Disp: , Rfl:     SYNTHROID 137 MCG tablet, Take 1 tablet (137 mcg total) by mouth daily Synthroid, brand necessary, Disp: 30 tablet, Rfl: 6    Review of Systems   Constitutional: Positive for fatigue and unexpected weight change  Negative for chills and fever  Hair loss   HENT: Negative for sore throat and trouble swallowing  Eyes: Negative for visual disturbance  Respiratory: Negative for shortness of breath  Cardiovascular: Negative for chest pain and palpitations  Gastrointestinal: Negative for abdominal pain, constipation and diarrhea  Endocrine: Negative for cold intolerance, heat intolerance, polydipsia, polyphagia and polyuria  Genitourinary: Negative for frequency  Musculoskeletal: Negative for arthralgias and myalgias  Skin: Negative for rash  Neurological: Negative for dizziness and tremors  Hematological: Negative for adenopathy  Psychiatric/Behavioral: Negative for sleep disturbance  All other systems reviewed and are negative  Physical Exam:  Body mass index is 30 99 kg/m²  /68   Pulse 69   Ht 5' 6" (1 676 m)   Wt 87 1 kg (192 lb)   BMI 30 99 kg/m²    Wt Readings from Last 3 Encounters:   10/23/19 87 1 kg (192 lb)   03/20/19 82 1 kg (181 lb)   08/10/18 87 2 kg (192 lb 3 2 oz)       Physical Exam   Constitutional: She is oriented to person, place, and time  She appears well-developed and well-nourished  No distress  HENT:   Head: Normocephalic and atraumatic  Eyes: Pupils are equal, round, and reactive to light  Conjunctivae are normal    Neck: Normal range of motion  Neck supple  No thyromegaly present  Cardiovascular: Normal rate, regular rhythm and normal heart sounds  Pulmonary/Chest: Effort normal and breath sounds normal  No respiratory distress  She has no wheezes  She has no rales  Abdominal: Soft  Bowel sounds are normal  She exhibits no distension  There is no tenderness  Musculoskeletal: Normal range of motion  She exhibits no edema  Neurological: She is alert and oriented to person, place, and time  Skin: Skin is warm and dry     Psychiatric: She has a normal mood and affect  Vitals reviewed  Labs:     Component      Latest Ref Rng & Units 10/8/2019   TSH, POC      mIU/L 3 37   Free T4      0 8 - 1 8 ng/dL 1 2         THYROID ULTRASOUND     INDICATION:    E04 1: Nontoxic single thyroid nodule      COMPARISON:  Thyroid sonogram 6/8/2016     TECHNIQUE:   Ultrasound of the thyroid was performed with a high frequency linear transducer in transverse and sagittal planes including volumetric imaging sweeps as well as traditional still imaging technique      FINDINGS:  Thyroid parenchyma is diffusely heterogeneous in echotexture      Right lobe:  5 1 x 1 3 x 1 5 cm  Left lobe:  4 7 x 1 x 1 2 cm  Isthmus:  0 2 cm      Nodule #1  Image 14  RIGHT midgland nodule measuring 0 5 x 0 5 x 0 5 cm previously 0 8 x 0 7 x 0 8 cm  This nodule has decreased in size from prior  COMPOSITION:  2 points, solid or almost completely solid   ECHOGENICITY:  2 points, hypoechoic  SHAPE:  0 points, wider-than-tall  MARGIN: 0 points, smooth  ECHOGENIC FOCI:  0 points, none or large comet-tail artifacts  TI-RADS Classification: TR 4 (4-6 points), Moderately suspicious  FNA if > 1 5 cm  Follow if > 1cm      No hypervascularity       IMPRESSION:     No nodule meets current ACR criteria for requiring biopsy or followup ultrasounds  Impression & Plan:    Problem List Items Addressed This Visit        Endocrine    Hypothyroidism due to Hashimoto's thyroiditis - Primary     Will switch to brand name Synthroid as per patient she has not been getting the same manufacture for her levothyroxine  This can decrease effectiveness up to 40%  Increase dose to 137 mcg  Repeat thyroid function test in 6 weeks          Relevant Medications    SYNTHROID 137 MCG tablet    Other Relevant Orders    T4, free    TSH, 3rd generation    Right thyroid nodule     Stable nodule does not meet criteria for biopsy  Repeat US in one year for continued surveillance            Relevant Medications    SYNTHROID 137 MCG tablet          Orders Placed This Encounter   Procedures    T4, free     Standing Status:   Future     Number of Occurrences:   1     Standing Expiration Date:   10/23/2020    TSH, 3rd generation     This is a patient instruction: This test is non-fasting  Please drink two glasses of water morning of bloodwork  Standing Status:   Future     Number of Occurrences:   1     Standing Expiration Date:   10/23/2020       Discussed with the patient and all questioned fully answered  She will call me if any problems arise  Follow-up appointment in 6 months       Counseled patient on diagnostic results, prognosis, risk and benefit of treatment options, instruction for management, importance of treatment compliance, Risk  factor reduction and impressions      Devon Avina [address]

## 2019-10-23 ENCOUNTER — OFFICE VISIT (OUTPATIENT)
Dept: ENDOCRINOLOGY | Facility: CLINIC | Age: 29
End: 2019-10-23
Payer: COMMERCIAL

## 2019-10-23 VITALS
HEIGHT: 66 IN | DIASTOLIC BLOOD PRESSURE: 68 MMHG | HEART RATE: 69 BPM | SYSTOLIC BLOOD PRESSURE: 110 MMHG | BODY MASS INDEX: 30.86 KG/M2 | WEIGHT: 192 LBS

## 2019-10-23 DIAGNOSIS — E04.1 RIGHT THYROID NODULE: ICD-10-CM

## 2019-10-23 DIAGNOSIS — E03.8 HYPOTHYROIDISM DUE TO HASHIMOTO'S THYROIDITIS: Primary | ICD-10-CM

## 2019-10-23 DIAGNOSIS — E06.3 HYPOTHYROIDISM DUE TO HASHIMOTO'S THYROIDITIS: Primary | ICD-10-CM

## 2019-10-23 PROCEDURE — 99214 OFFICE O/P EST MOD 30 MIN: CPT | Performed by: NURSE PRACTITIONER

## 2019-10-23 RX ORDER — LEVOTHYROXINE SODIUM 137 MCG
137 TABLET ORAL DAILY
Qty: 30 TABLET | Refills: 6 | Status: SHIPPED | OUTPATIENT
Start: 2019-10-23 | End: 2019-10-24

## 2019-10-23 NOTE — ASSESSMENT & PLAN NOTE
Will switch to brand name Synthroid as per patient she has not been getting the same manufacture for her levothyroxine  This can decrease effectiveness up to 40%  Increase dose to 137 mcg   Repeat thyroid function test in 6 weeks

## 2019-10-24 ENCOUNTER — TELEPHONE (OUTPATIENT)
Dept: ENDOCRINOLOGY | Facility: CLINIC | Age: 29
End: 2019-10-24

## 2019-10-24 DIAGNOSIS — E03.8 HYPOTHYROIDISM DUE TO HASHIMOTO'S THYROIDITIS: Primary | ICD-10-CM

## 2019-10-24 DIAGNOSIS — E06.3 HYPOTHYROIDISM DUE TO HASHIMOTO'S THYROIDITIS: Primary | ICD-10-CM

## 2019-10-24 RX ORDER — LEVOTHYROXINE SODIUM 137 UG/1
137 TABLET ORAL DAILY
COMMUNITY
End: 2019-10-24

## 2019-10-24 NOTE — TELEPHONE ENCOUNTER
Pt called and wants her synthroid changed to generic the brand name is to expensive, is this ok to do

## 2019-10-24 NOTE — TELEPHONE ENCOUNTER
Patient left a message with the answering service on 10/23 at 4:39 pm stating that she was just in the office and was given a script but the pharmacy needs orders to say it can be changed to a generic because the name brand is too expensive  Her number is [phone]  Thank you

## 2019-10-25 RX ORDER — LEVOTHYROXINE SODIUM 137 UG/1
137 TABLET ORAL DAILY
Qty: 30 TABLET | Refills: 6 | Status: SHIPPED | OUTPATIENT
Start: 2019-10-25

## 2020-12-04 ENCOUNTER — NURSE TRIAGE (OUTPATIENT)
Dept: OTHER | Facility: OTHER | Age: 30
End: 2020-12-04

## 2020-12-04 ENCOUNTER — HOSPITAL ENCOUNTER (EMERGENCY)
Facility: HOSPITAL | Age: 30
Discharge: HOME | End: 2020-12-04
Attending: EMERGENCY MEDICINE
Payer: COMMERCIAL

## 2020-12-04 VITALS
RESPIRATION RATE: 17 BRPM | SYSTOLIC BLOOD PRESSURE: 136 MMHG | OXYGEN SATURATION: 100 % | HEART RATE: 82 BPM | DIASTOLIC BLOOD PRESSURE: 66 MMHG | WEIGHT: 179.01 LBS | TEMPERATURE: 98.7 F | BODY MASS INDEX: 28.89 KG/M2

## 2020-12-04 DIAGNOSIS — U07.1 COVID-19 VIRUS INFECTION: Primary | ICD-10-CM

## 2020-12-04 PROCEDURE — 99283 EMERGENCY DEPT VISIT LOW MDM: CPT

## 2020-12-04 PROCEDURE — 99282 EMERGENCY DEPT VISIT SF MDM: CPT | Performed by: EMERGENCY MEDICINE

## 2020-12-04 PROCEDURE — U0003 INFECTIOUS AGENT DETECTION BY NUCLEIC ACID (DNA OR RNA); SEVERE ACUTE RESPIRATORY SYNDROME CORONAVIRUS 2 (SARS-COV-2) (CORONAVIRUS DISEASE [COVID-19]), AMPLIFIED PROBE TECHNIQUE, MAKING USE OF HIGH THROUGHPUT TECHNOLOGIES AS DESCRIBED BY CMS-2020-01-R: HCPCS | Performed by: EMERGENCY MEDICINE

## 2020-12-05 LAB — SARS-COV-2 RNA SPEC QL NAA+PROBE: DETECTED

## 2021-04-29 ENCOUNTER — IMMUNIZATIONS (OUTPATIENT)
Dept: FAMILY MEDICINE CLINIC | Facility: HOSPITAL | Age: 31
End: 2021-04-29

## 2021-04-29 DIAGNOSIS — Z23 ENCOUNTER FOR IMMUNIZATION: Primary | ICD-10-CM

## 2021-04-29 PROCEDURE — 0011A SARS-COV-2 / COVID-19 MRNA VACCINE (MODERNA) 100 MCG: CPT

## 2021-04-29 PROCEDURE — 91301 SARS-COV-2 / COVID-19 MRNA VACCINE (MODERNA) 100 MCG: CPT

## 2021-05-27 ENCOUNTER — IMMUNIZATIONS (OUTPATIENT)
Dept: FAMILY MEDICINE CLINIC | Facility: HOSPITAL | Age: 31
End: 2021-05-27

## 2021-05-27 DIAGNOSIS — Z23 ENCOUNTER FOR IMMUNIZATION: Primary | ICD-10-CM

## 2021-05-27 PROCEDURE — 91301 SARS-COV-2 / COVID-19 MRNA VACCINE (MODERNA) 100 MCG: CPT

## 2021-05-27 PROCEDURE — 0012A SARS-COV-2 / COVID-19 MRNA VACCINE (MODERNA) 100 MCG: CPT

## 2022-03-03 NOTE — PROGRESS NOTES
OB Triage Note  Patient of Dr Floridalma Mccollum:    29 yo  at 36 5/7 c/o LOF    CC:  Leaking fluid  Was 1 cm yesterday in the office    HPI:  Contractions:  Yes, painful 9/10 with ctxn, not between   Fetal movement:  yes  Vaginal bleeding:   Yes, with ROM per patient  Leaking of fluid:  Yes, at 0600, clear    OB complications:  Obesity  Hypothyroidism  Weight gain  IUI pregnancy    PMH:  Hashimoto thyroiditis    PSH:  ORIF Right Arm    Objective    GBS: negative  Vital signs:   Vitals:    01/10/18 0722 01/10/18 0743   BP: 122/80 122/80   Pulse: 78 78   Resp: 18 18   Temp: 98 2 °F (36 8 °C) 98 2 °F (36 8 °C)   TempSrc: Oral Oral   Weight: 109 kg (240 lb) 109 kg (240 lb)   Height: 5' 7" (1 702 m)        SVE: 290-1  FHT: 135 reactive  [place]: subjective ctxn q4 min  Speculum exam:  [place]: Pool: Nitrazine:         Assessment and Plan:  29 yo  at 36 5/7 with ROM at 0600 today  GBS negative  Painful ctxn  Desires analgesia  Plan for: stadol and phenergan and expectant management  D/W Dr Christian Blackmon MD  OB/GYN  1/10/2018 8:20 AM · Goal SBP < 180  · Goal MAP > 65  · Holding lisinopril 50mg daily for now given slight creatinine elevation from baseline in the setting of recent contrast load  · Resume home amlodipine 10 mg daily   · VS per step down protocol

## 2023-05-01 NOTE — H&P (VIEW-ONLY)
Assessment/Plan:    Based upon the history given and current physical findings, I have recommended that the patient undergo tonsillectomy  All risks, benefits, alternatives, and complications of the procedure have been reviewed in detail  The risks of the surgery include but are not limited to: bleeding, infection, voice change, recurrent sore throat, swallowing difficulty, and the need for further surgery  The patient / parent understands and accept all risks of the surgery  Pre-operative lab tests have been ordered  Post operative instructions were reivewed and given to the patient / parent  Post operative medication was given and the patient / parent was  instructed to fill the medication in preparation for the surgery  A  post-operative appointment has been made for the patient  If any questions should arise prior to or after the surgery, the patient / parent should call my office and I will be glad to discuss any questions or concerns with them  Encounter Diagnosis     ICD-10-CM    1  Other chronic diseases of tonsils and adenoids  J35 8       2  Recurrent streptococcal tonsillitis  J03 01       3  Recurrent acute tonsillitis  J03 91 oxyCODONE-acetaminophen (PERCOCET) 5-325 mg per tablet     predniSONE 20 mg tablet                 Patient ID: Ana Luisa Leyva is a 28 y o  female  Radha Popper has not been seen for about two years  The last time she was here we set her up for a tonsillectomy  This was cancelled due to Covid infection  She is back today for the same issue  She had a positive rapid strep in February when she was seen at [facility]  She has a history of recurrent tonsillith formation and recurrent strep throat - she wants the tonsils removed         The following portions of the patient's history were reviewed and updated as appropriate: allergies, current medications, past family history, past medical history, past social history, past surgical history and problem list       Past Medical History:   Diagnosis Date   • Chickenpox    • Hashimoto's thyroiditis    • IBS (irritable bowel syndrome)     last assessed 16   • Late menarche     13   • Migraine    • Obesity (BMI 30-39 9)     last assessed 16   • Pregnancy        • Vitamin D deficiency     last assessed 16       Past Surgical History:   Procedure Laterality Date   • ORIF HUMERUS FRACTURE Left    • TOOTH EXTRACTION      WTE       Social History     Tobacco Use   • Smoking status: Never   • Smokeless tobacco: Never   Vaping Use   • Vaping Use: Never used   Substance Use Topics   • Alcohol use: No     Comment: social stopped with IUI   • Drug use: No       Current Outpatient Medications on File Prior to Visit   Medication Sig Dispense Refill   • [place] Thyroid 60 MG tablet TAKE 60MG (1 TAB) ON MONDAY, THURSDAY AND SATURDAY ALTERNATING WITH ARMOUR THYROID 90 MG     • pantoprazole (PROTONIX) 40 mg tablet Take 40 mg by mouth 2 (two) times a day     • levothyroxine 137 mcg tablet Take 1 tablet (137 mcg total) by mouth daily 30 tablet 6   • Nutritional Supplements (VITAMIN D MAINTENANCE PO) Take 6,000 Units by mouth daily     • Prenatal Vit-Fe Fumarate-FA (PRENATAL VITAMIN PO) Take by mouth     • [DISCONTINUED] oxyCODONE-acetaminophen (PERCOCET) 5-325 mg per tablet Take 1 tablet by mouth every 6 (six) hours as needed for moderate painMax Daily Amount: 4 tablets (Patient not taking: Reported on 2021) 20 tablet 0   • [DISCONTINUED] predniSONE 20 mg tablet 3 tabs day 1-3, 2 tabs day 4-6, 1 tab day 7-9 (Patient not taking: Reported on 2021) 18 tablet 0     No current facility-administered medications on file prior to visit  Allergies   Allergen Reactions   • Nuts - Food Allergy    • Other            Review of Systems   Constitutional: Negative for activity change, appetite change, fatigue, fever and unexpected weight change     HENT: Negative for congestion, ear discharge, ear pain, hearing loss, nosebleeds, postnasal "drip, rhinorrhea, sinus pressure, sinus pain, sneezing, sore throat, tinnitus, trouble swallowing and voice change  Eyes: Negative  Negative for photophobia, pain, itching and visual disturbance  Respiratory: Negative  Negative for cough, chest tightness and shortness of breath  Cardiovascular: Negative  Negative for chest pain  Gastrointestinal: Negative  Negative for abdominal pain  Endocrine: Negative  Musculoskeletal: Negative  Negative for gait problem, neck pain and neck stiffness  Skin: Negative  Allergic/Immunologic: Negative  Negative for environmental allergies  Neurological: Negative  Negative for dizziness, speech difficulty, light-headedness and headaches  Hematological: Negative  Negative for adenopathy  Psychiatric/Behavioral: Negative  Negative for sleep disturbance  The patient is not nervous/anxious  /80   Pulse 76   Ht 5' 6\" (1 676 m)   Wt 79 8 kg (176 lb)   BMI 28 41 kg/m²       PHYSICAL  EXAMINATION    CONSTITUTION:    Appears appropriate for age  No evidence of any acute distress  Communicates normally  Voice quality is clear  Alert and oriented  HEAD/FACE:    Atraumatic, normocephalic on inspection  No scars present  Salivary glands are normal in texture and size without any asymmetry  Facial nerve function is symmetric and normal     EYES:    Extraocular muscles intact in both eyes, normal gaze bilaterally and no evidence of nystagmus  Pupils equal, round, and accommodate to light bilaterally  EARS:    External ears normal   Multiple piercings  External canals are clear and dry  Tympanic membranes intact with normal mobility, no effusion, no retraction, no perforation  Post auricular area is normal    NOSE:    External nose without deformity  Left alar piercing  Internal mucosa pink and moist     Septum midline      Inferior nasal turbinates normal in color and size bilaterally    ORAL CAVITY:    Lips " normal and healthy in appearance  Dentition normal     Gums healthy, pink and moist     Tongue appears pink and moist with no lesions  Piercing present  Floor of mouth pink, moist, and smooth  Submandibular ducts patent with clear saliva  Parotid ducts patent with clear saliva  Oral mucosa pink and moist     Hard palate normal in appearance without any lesions  OROPHARYNX:    Soft palate pink and moist without any lesions  Uvula midline without any lesions  Tonsils grade 2 and cryptic bilaterally  Posterior pharynx pink and moist without any lesions    NECK:    Supple and symmetric  No masses noted  Trachea midline  No thyromegaly or nodules noted  LYMPH:    No palpable adenopathy in left or right neck    SKIN:    No rashes  No lesions noted       HEART:   Regular rate and rhythm    LUNGS:  Clear to auscultation bilaterally

## 2023-05-18 RX ORDER — LORATADINE 10 MG/1
10 TABLET ORAL DAILY
COMMUNITY

## 2023-05-18 NOTE — PRE-PROCEDURE INSTRUCTIONS
Pre-Surgery Instructions:   Medication Instructions   • [place] Thyroid 60 MG tablet Take day of surgery  • loratadine (CLARITIN) 10 mg tablet Take day of surgery  • pantoprazole (PROTONIX) 40 mg tablet Take day of surgery  Medication instructions for day surgery reviewed  Please use only a sip of water to take your instructed medications  Avoid all over the counter vitamins, supplements and NSAIDS for one week prior to surgery per anesthesia guidelines  Tylenol is ok to take as needed  You will receive a call one business day prior to surgery with an arrival time and hospital directions  If your surgery is scheduled on a Monday, the hospital will be calling you on the Friday prior to your surgery  If you have not heard from anyone by 8pm, please call the hospital supervisor through the hospital  at [phone]  Sandra Astorga [phone])  Do not eat or drink anything after midnight the night before your surgery, including candy, mints, lifesavers, or chewing gum  Do not drink alcohol 24hrs before your surgery  Try not to smoke at least 24hrs before your surgery  Follow the pre surgery showering instructions as listed in the [facility] Surgical Experience Booklet” or otherwise provided by your surgeon's office  Do not shave the surgical area 24 hours before surgery  Do not apply any lotions, creams, including makeup, cologne, deodorant, or perfumes after showering on the day of your surgery  No contact lenses, eye make-up, or artificial eyelashes  Remove nail polish, including gel polish, and any artificial, gel, or acrylic nails if possible  Remove all jewelry including rings and body piercing jewelry  Wear causal clothing that is easy to take on and off  Consider your type of surgery  Keep any valuables, jewelry, piercings at home  Please bring any specially ordered equipment (sling, braces) if indicated      Arrange for a responsible person to drive you to and from the hospital on the day of your surgery  Visitor Guidelines discussed  Call the surgeon's office with any new illnesses, exposures, or additional questions prior to surgery  Please reference your [facility] Surgical Experience Booklet” for additional information to prepare for your upcoming surgery

## 2023-05-20 ENCOUNTER — ANESTHESIA EVENT (OUTPATIENT)
Dept: PERIOP | Facility: HOSPITAL | Age: 33
End: 2023-05-20

## 2023-05-23 ENCOUNTER — ANESTHESIA (OUTPATIENT)
Dept: PERIOP | Facility: HOSPITAL | Age: 33
End: 2023-05-23

## 2023-05-23 ENCOUNTER — HOSPITAL ENCOUNTER (OUTPATIENT)
Facility: HOSPITAL | Age: 33
Setting detail: OUTPATIENT SURGERY
Discharge: HOME | End: 2023-05-23
Attending: OTOLARYNGOLOGY | Admitting: OTOLARYNGOLOGY
Payer: COMMERCIAL

## 2023-05-23 VITALS
TEMPERATURE: 97.2 F | SYSTOLIC BLOOD PRESSURE: 113 MMHG | WEIGHT: 174.82 LBS | OXYGEN SATURATION: 100 % | DIASTOLIC BLOOD PRESSURE: 70 MMHG | HEIGHT: 66 IN | RESPIRATION RATE: 16 BRPM | HEART RATE: 62 BPM | BODY MASS INDEX: 28.1 KG/M2

## 2023-05-23 DIAGNOSIS — J03.91 RECURRENT ACUTE TONSILLITIS: ICD-10-CM

## 2023-05-23 DIAGNOSIS — J35.8 OTHER CHRONIC DISEASES OF TONSILS AND ADENOIDS: ICD-10-CM

## 2023-05-23 DIAGNOSIS — J03.01 RECURRENT STREPTOCOCCAL TONSILLITIS: ICD-10-CM

## 2023-05-23 DIAGNOSIS — Z01.812 PRE-OPERATIVE LABORATORY EXAMINATION: ICD-10-CM

## 2023-05-23 DIAGNOSIS — Z01.818 PREOPERATIVE TESTING: ICD-10-CM

## 2023-05-23 LAB
EXT PREGNANCY TEST URINE: NEGATIVE
EXT. CONTROL: NORMAL

## 2023-05-23 PROCEDURE — 88312 SPECIAL STAINS GROUP 1: CPT | Performed by: STUDENT IN AN ORGANIZED HEALTH CARE EDUCATION/TRAINING PROGRAM

## 2023-05-23 PROCEDURE — 81025 URINE PREGNANCY TEST: CPT | Performed by: OTOLARYNGOLOGY

## 2023-05-23 PROCEDURE — 42826 REMOVAL OF TONSILS: CPT | Performed by: OTOLARYNGOLOGY

## 2023-05-23 PROCEDURE — 88365 INSITU HYBRIDIZATION (FISH): CPT | Performed by: STUDENT IN AN ORGANIZED HEALTH CARE EDUCATION/TRAINING PROGRAM

## 2023-05-23 PROCEDURE — 88307 TISSUE EXAM BY PATHOLOGIST: CPT | Performed by: STUDENT IN AN ORGANIZED HEALTH CARE EDUCATION/TRAINING PROGRAM

## 2023-05-23 PROCEDURE — 88360 TUMOR IMMUNOHISTOCHEM/MANUAL: CPT | Performed by: STUDENT IN AN ORGANIZED HEALTH CARE EDUCATION/TRAINING PROGRAM

## 2023-05-23 PROCEDURE — 88364 INSITU HYBRIDIZATION (FISH): CPT | Performed by: STUDENT IN AN ORGANIZED HEALTH CARE EDUCATION/TRAINING PROGRAM

## 2023-05-23 PROCEDURE — 88341 IMHCHEM/IMCYTCHM EA ADD ANTB: CPT | Performed by: STUDENT IN AN ORGANIZED HEALTH CARE EDUCATION/TRAINING PROGRAM

## 2023-05-23 PROCEDURE — 88342 IMHCHEM/IMCYTCHM 1ST ANTB: CPT | Performed by: STUDENT IN AN ORGANIZED HEALTH CARE EDUCATION/TRAINING PROGRAM

## 2023-05-23 RX ORDER — ROCURONIUM BROMIDE 10 MG/ML
INJECTION, SOLUTION INTRAVENOUS AS NEEDED
Status: DISCONTINUED | OUTPATIENT
Start: 2023-05-23 | End: 2023-05-23

## 2023-05-23 RX ORDER — DEXTROSE AND SODIUM CHLORIDE 5; .9 G/100ML; G/100ML
125 INJECTION, SOLUTION INTRAVENOUS CONTINUOUS
Status: DISCONTINUED | OUTPATIENT
Start: 2023-05-23 | End: 2023-05-23 | Stop reason: HOSPADM

## 2023-05-23 RX ORDER — OXYCODONE HYDROCHLORIDE AND ACETAMINOPHEN 5; 325 MG/1; MG/1
2 TABLET ORAL EVERY 4 HOURS PRN
Status: DISCONTINUED | OUTPATIENT
Start: 2023-05-23 | End: 2023-05-23 | Stop reason: HOSPADM

## 2023-05-23 RX ORDER — FENTANYL CITRATE/PF 50 MCG/ML
25 SYRINGE (ML) INJECTION
Status: DISCONTINUED | OUTPATIENT
Start: 2023-05-23 | End: 2023-05-23 | Stop reason: HOSPADM

## 2023-05-23 RX ORDER — PROPOFOL 10 MG/ML
INJECTION, EMULSION INTRAVENOUS CONTINUOUS PRN
Status: DISCONTINUED | OUTPATIENT
Start: 2023-05-23 | End: 2023-05-23

## 2023-05-23 RX ORDER — MIDAZOLAM HYDROCHLORIDE 2 MG/2ML
INJECTION, SOLUTION INTRAMUSCULAR; INTRAVENOUS AS NEEDED
Status: DISCONTINUED | OUTPATIENT
Start: 2023-05-23 | End: 2023-05-23

## 2023-05-23 RX ORDER — SODIUM CHLORIDE 9 MG/ML
125 INJECTION, SOLUTION INTRAVENOUS CONTINUOUS
Status: DISCONTINUED | OUTPATIENT
Start: 2023-05-23 | End: 2023-05-23 | Stop reason: HOSPADM

## 2023-05-23 RX ORDER — ONDANSETRON 2 MG/ML
4 INJECTION INTRAMUSCULAR; INTRAVENOUS ONCE AS NEEDED
Status: DISCONTINUED | OUTPATIENT
Start: 2023-05-23 | End: 2023-05-23 | Stop reason: HOSPADM

## 2023-05-23 RX ORDER — ONDANSETRON 2 MG/ML
4 INJECTION INTRAMUSCULAR; INTRAVENOUS EVERY 6 HOURS PRN
Status: DISCONTINUED | OUTPATIENT
Start: 2023-05-23 | End: 2023-05-23 | Stop reason: HOSPADM

## 2023-05-23 RX ORDER — LIDOCAINE HCL/PF 100 MG/5ML
SYRINGE (ML) INJECTION AS NEEDED
Status: DISCONTINUED | OUTPATIENT
Start: 2023-05-23 | End: 2023-05-23

## 2023-05-23 RX ORDER — MAGNESIUM HYDROXIDE 1200 MG/15ML
LIQUID ORAL AS NEEDED
Status: DISCONTINUED | OUTPATIENT
Start: 2023-05-23 | End: 2023-05-23 | Stop reason: HOSPADM

## 2023-05-23 RX ORDER — DEXAMETHASONE SODIUM PHOSPHATE 10 MG/ML
INJECTION, SOLUTION INTRAMUSCULAR; INTRAVENOUS AS NEEDED
Status: DISCONTINUED | OUTPATIENT
Start: 2023-05-23 | End: 2023-05-23

## 2023-05-23 RX ORDER — ACETAMINOPHEN 325 MG/1
650 TABLET ORAL EVERY 4 HOURS PRN
Status: DISCONTINUED | OUTPATIENT
Start: 2023-05-23 | End: 2023-05-23 | Stop reason: HOSPADM

## 2023-05-23 RX ORDER — SUCCINYLCHOLINE/SOD CL,ISO/PF 100 MG/5ML
SYRINGE (ML) INTRAVENOUS AS NEEDED
Status: DISCONTINUED | OUTPATIENT
Start: 2023-05-23 | End: 2023-05-23

## 2023-05-23 RX ORDER — ONDANSETRON 2 MG/ML
INJECTION INTRAMUSCULAR; INTRAVENOUS AS NEEDED
Status: DISCONTINUED | OUTPATIENT
Start: 2023-05-23 | End: 2023-05-23

## 2023-05-23 RX ORDER — PROPOFOL 10 MG/ML
INJECTION, EMULSION INTRAVENOUS AS NEEDED
Status: DISCONTINUED | OUTPATIENT
Start: 2023-05-23 | End: 2023-05-23

## 2023-05-23 RX ADMIN — PROPOFOL 130 MCG/KG/MIN: 10 INJECTION, EMULSION INTRAVENOUS at 08:21

## 2023-05-23 RX ADMIN — PROPOFOL 200 MG: 10 INJECTION, EMULSION INTRAVENOUS at 08:21

## 2023-05-23 RX ADMIN — FENTANYL CITRATE 25 MCG: 50 INJECTION INTRAMUSCULAR; INTRAVENOUS at 08:59

## 2023-05-23 RX ADMIN — FENTANYL CITRATE 25 MCG: 50 INJECTION INTRAMUSCULAR; INTRAVENOUS at 09:17

## 2023-05-23 RX ADMIN — ONDANSETRON 4 MG: 2 INJECTION INTRAMUSCULAR; INTRAVENOUS at 08:21

## 2023-05-23 RX ADMIN — LIDOCAINE HYDROCHLORIDE 100 MG: 20 INJECTION INTRAVENOUS at 08:21

## 2023-05-23 RX ADMIN — MIDAZOLAM 2 MG: 1 INJECTION INTRAMUSCULAR; INTRAVENOUS at 08:12

## 2023-05-23 RX ADMIN — DEXAMETHASONE SODIUM PHOSPHATE 10 MG: 10 INJECTION INTRAMUSCULAR; INTRAVENOUS at 08:21

## 2023-05-23 RX ADMIN — SODIUM CHLORIDE 0.18 MCG/KG/MIN: 9 INJECTION, SOLUTION INTRAVENOUS at 08:21

## 2023-05-23 RX ADMIN — Medication 100 MG: at 08:21

## 2023-05-23 RX ADMIN — SODIUM CHLORIDE: 0.9 INJECTION, SOLUTION INTRAVENOUS at 08:36

## 2023-05-23 RX ADMIN — ROCURONIUM BROMIDE 5 MG: 10 INJECTION, SOLUTION INTRAVENOUS at 08:21

## 2023-05-23 RX ADMIN — OXYCODONE AND ACETAMINOPHEN 1 TABLET: 5; 325 TABLET ORAL at 09:40

## 2023-05-23 RX ADMIN — SODIUM CHLORIDE 125 ML/HR: 0.9 INJECTION, SOLUTION INTRAVENOUS at 07:00

## 2023-05-23 NOTE — INTERVAL H&P NOTE
H&P reviewed  After examining the patient I find no changes in the patients condition since the H&P had been written      Vitals:    05/23/23 0652   BP: 120/73   Pulse: 69   Resp: 16   Temp: 98 °F (36 7 °C)   SpO2: 100%

## 2023-05-23 NOTE — ANESTHESIA POSTPROCEDURE EVALUATION
Post-Op Assessment Note    CV Status:  Stable    Pain management: adequate     Mental Status:  Alert and awake   Hydration Status:  Euvolemic   PONV Controlled:  Controlled   Airway Patency:  Patent      Post Op Vitals Reviewed: Yes      Staff: Anesthesiologist, CRNA         No notable events documented      /70 (05/23/23 1002)    Temp     Pulse 62 (05/23/23 1002)   Resp 16 (05/23/23 1002)    SpO2 100 % (05/23/23 1002)

## 2023-05-23 NOTE — ANESTHESIA PREPROCEDURE EVALUATION
Procedure:  TONSILLECTOMY (Throat)    Relevant Problems   ENDO   (+) Hypothyroidism due to Hashimoto's thyroiditis      GI/HEPATIC   (+) GERD (gastroesophageal reflux disease)        Physical Exam    Airway    Mallampati score: II  TM Distance: >3 FB  Neck ROM: full     Dental   No notable dental hx     Cardiovascular  Rhythm: regular, Rate: normal, Cardiovascular exam normal    Pulmonary  Pulmonary exam normal Breath sounds clear to auscultation,     Other Findings        Anesthesia Plan  ASA Score- 2     Anesthesia Type- general with ASA Monitors  Additional Monitors:   Airway Plan: ETT  Plan Factors-    Chart reviewed  Existing labs reviewed  Patient summary reviewed  Patient is not a current smoker  Patient not instructed to abstain from smoking on day of procedure  Patient did not smoke on day of surgery  Induction- intravenous  Postoperative Plan-     Informed Consent- Anesthetic plan and risks discussed with patient and spouse

## 2023-05-23 NOTE — DISCHARGE INSTR - AVS FIRST PAGE
Karen Jane  1990      [facility]      Postoperative tonsillectomy instructions    1  Force fluids as much as possible  This will promote healing and maintain adequate hydration  Certain fruit juices such as apple and apricot juice, soft drinks, and frozen drink bars are suggested  2   Do not drink through a straw  This may cause bleeding if there is contact with the surgical site  3   Milk or ice cream should be avoided for the first 24 hours due to increased mucus production  Soft foods like gelatin, ice cream, custard, puddings, and mashed foods are helpful to maintain adequate nutrition  Spicy, scratchy, or rough foods such as toast, crackers, and potato chips should be avoided since they may scratch the tonsil site and cause bleeding  4   No red colored food or liquid  5   A moderate amount of throat and ear discomfort is to be expected  6   Foul-smelling breath is normal and is not a sign of infection  7   You may see crusty white patches in your throat  This is a temporary normal covering during the healing period and is NOT a sign of infection  After the first week the white patches can be expected to come off and may cause slight bleeding  The best way to prevent buildup of too much crusting and bleeding is to keep the throat moist with lots of fluids  8   You should have lots of rest and limited activity at home until your first postoperative visit  No strenuous activities, bending, or lifting until approved by the surgeon  No travel out of your immediate area  9   If severe pain, bleeding, or fever greater than 101°F notify our office immediately at [phone] or [phone] or go immediately to the emergency room  10   If a prescription for pain medication was given follow appropriate directions on label  If no prescription was given you may take over the counter pain medication as needed      11   If a prescription for steroids (Prednisone, prednisolone) was given you may begin taking this medication the day following the surgical procedure  12  No smoking  Avoid second hand smoke exposure

## 2023-05-23 NOTE — OP NOTE
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]     * Recurrent acute tonsillitis [J03 91]     * Preoperative testing [Z01 818]     * Pre-operative laboratory examination [Z01 812]    Procedure(s):  TONSILLECTOMY    Specimen(s):  ID Type Source Tests Collected by Time Destination   1 : bilateral tonsils Tissue Tonsil TISSUE EXAM Karri Hernandez DO 2023 0831        Estimated Blood Loss:   Minimal    Drains:  * No LDAs found *    Anesthesia Type:   General    Operative Indications: Other chronic diseases of tonsils and adenoids [J35 8]  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]  Recurrent acute tonsillitis [J03 91]  Preoperative testing [Z01 818]  Pre-operative laboratory examination [Z01 812]      Operative Findings:  Cryptic tonsils with stones    Complications:   None    Procedure and Technique:  Patient was identified in the holding area and taken to the OR  Patient was placed on the OR table in supine position and placed under general anesthesia with an endotracheal tube  Table was turned 90 degrees and prepped and draped in usual fashion for the above procedure  Time out was obtained and all information correct and agreed upon by surgeon, OR staff and anesthesia  The oral cavity was opened using a McGyver mouthgag and held in place with smith stand suspension  Evaluation of the oral cavity revealed that the left tonsil was grade 2 and the right tonsil was grade 2  Attention was first turned to the right tonsil  This was grasped with a curved Stephany forceps and pulled medially  Using the coblation wand on the standard settings the tonsil was removed from the tonsillar fossa using the coblation setting  Any visible vessels which were seen just under the tissue were coagulated with the coagulation setting on the unit  The same procedure was then completed on the opposite side  At the completion of the tonsillectomy the fossae were dry  Soft suction catheter was then passed into the esophagus and stomach to remove any gastric contents and then removed  The mouthgag was let down and then reopened to evaluated the oropharyngeal area to make sure that there was no bleeding  Once confirmed, the mouthgag was removed and the bed turned back 90 degrees towards anesthesia  The patient was awoken, extubated and taken to the PACU in stable condition  I was present for the entire procedure      Patient Disposition:  PACU         SIGNATURE: Kwabena Villalobos DO  DATE: May 23, 2023  TIME: 8:45 AM

## 2023-06-05 PROCEDURE — 88307 TISSUE EXAM BY PATHOLOGIST: CPT | Performed by: STUDENT IN AN ORGANIZED HEALTH CARE EDUCATION/TRAINING PROGRAM

## 2023-06-05 PROCEDURE — 88342 IMHCHEM/IMCYTCHM 1ST ANTB: CPT | Performed by: STUDENT IN AN ORGANIZED HEALTH CARE EDUCATION/TRAINING PROGRAM

## (undated) DEVICE — GLOVE SRG BIOGEL ORTHOPEDIC 7

## (undated) DEVICE — SKIN MARKER DUAL TIP WITH RULER CAP, FLEXIBLE RULER AND LABELS: Brand: DEVON

## (undated) DEVICE — SCD SEQUENTIAL COMPRESSION COMFORT SLEEVE MEDIUM KNEE LENGTH: Brand: KENDALL SCD

## (undated) DEVICE — WAND COBLATION  PROCISE XP TONSIL

## (undated) DEVICE — AIRLIFE™ TRI-FLO™ SUCTION CATHETER WITH CONTROL PORT: Brand: AIRLIFE™

## (undated) DEVICE — SUCTION BOVIE ENT

## (undated) DEVICE — MEDI-VAC YANKAUER SUCTION HANDLE W/BULBOUS AND CONTROL VENT: Brand: CARDINAL HEALTH

## (undated) DEVICE — STERILE BETHLEHEM T AND A PACK: Brand: CARDINAL HEALTH

## (undated) DEVICE — 2000CC GUARDIAN II: Brand: GUARDIAN